# Patient Record
Sex: FEMALE | Race: WHITE | NOT HISPANIC OR LATINO | Employment: FULL TIME | URBAN - METROPOLITAN AREA
[De-identification: names, ages, dates, MRNs, and addresses within clinical notes are randomized per-mention and may not be internally consistent; named-entity substitution may affect disease eponyms.]

---

## 2017-05-17 ENCOUNTER — OFFICE VISIT (OUTPATIENT)
Dept: URGENT CARE | Facility: URGENT CARE | Age: 22
End: 2017-05-17
Payer: COMMERCIAL

## 2017-05-17 VITALS
HEART RATE: 88 BPM | DIASTOLIC BLOOD PRESSURE: 65 MMHG | OXYGEN SATURATION: 100 % | TEMPERATURE: 98.1 F | SYSTOLIC BLOOD PRESSURE: 103 MMHG

## 2017-05-17 DIAGNOSIS — L24.0 CONTACT DERMATITIS AND ECZEMA DUE TO DETERGENTS: ICD-10-CM

## 2017-05-17 DIAGNOSIS — J45.21 MILD INTERMITTENT ASTHMA WITH (ACUTE) EXACERBATION: ICD-10-CM

## 2017-05-17 DIAGNOSIS — J30.2 SEASONAL ALLERGIC RHINITIS, UNSPECIFIED ALLERGIC RHINITIS TRIGGER: Primary | ICD-10-CM

## 2017-05-17 DIAGNOSIS — J01.90 ACUTE SINUSITIS WITH SYMPTOMS > 10 DAYS: ICD-10-CM

## 2017-05-17 PROCEDURE — 99203 OFFICE O/P NEW LOW 30 MIN: CPT

## 2017-05-17 RX ORDER — FLUTICASONE PROPIONATE 50 MCG
1-2 SPRAY, SUSPENSION (ML) NASAL DAILY
Qty: 16 G | Refills: 0 | Status: SHIPPED | OUTPATIENT
Start: 2017-05-17 | End: 2018-10-24

## 2017-05-17 RX ORDER — ALBUTEROL SULFATE 90 UG/1
1-2 AEROSOL, METERED RESPIRATORY (INHALATION) EVERY 4 HOURS PRN
Qty: 1 INHALER | Refills: 0 | Status: SHIPPED | OUTPATIENT
Start: 2017-05-17 | End: 2018-10-24

## 2017-05-17 RX ORDER — AZITHROMYCIN 500 MG/1
500 TABLET, FILM COATED ORAL DAILY
Qty: 6 TABLET | Refills: 0 | Status: SHIPPED | OUTPATIENT
Start: 2017-05-17 | End: 2017-05-23

## 2017-05-17 NOTE — NURSING NOTE
"Chief Complaint   Patient presents with     Urgent Care     URI     Possible sinus infection that has been on and off since Dec 2016.     Derm Problem     Rash on back that patient noticed this morning that has gotten worst this evening.      Ent Problem     Patient is concern about her tonsils and believe it is causing pain in her left ear.        Initial /65 (BP Location: Right arm, Cuff Size: Adult Regular)  Pulse 88  Temp 98.1  F (36.7  C) (Oral)  SpO2 100%  Breastfeeding? No Estimated body mass index is 22.12 kg/(m^2) as calculated from the following:    Height as of 2/18/14: 5' 1.5\" (1.562 m).    Weight as of 2/18/14: 119 lb (54 kg).  Medication Reconciliation: complete.  IGNACIO Montanez      "

## 2017-05-17 NOTE — MR AVS SNAPSHOT
After Visit Summary   5/17/2017    Violet Rachel    MRN: [id number]           Patient Information     Date Of Birth          1995        Visit Information        Provider Department      5/17/2017 5:55 PM  URGENT CARE [facility]        Today's Diagnoses     Seasonal allergic rhinitis, unspecified allergic rhinitis trigger    -  1    Acute sinusitis with symptoms > 10 days        Mild intermittent asthma with (acute) exacerbation          Care Instructions      Asthma (Adult)  Asthma is a disease where the medium and  small air passages within the lung go into spasm and restrict the flow of air. Inflammation and swelling of the airways cause further restriction. During an acute asthma attack, these factors cause difficulty breathing, wheezing, cough and chest tightness.    An asthma attack can be triggered by many things. Common triggers include infections such as the common cold, bronchitis, pneumonia. Irritants such as smoke or pollutants in the air, emotional upset, and exercise can also trigger an attack. In many adults with asthma, allergies to dust, mold, pollen and animal dander can cause an asthma attack. Skipping doses of daily asthma medicine can also bring on an asthma attack.  Asthma can be controlled using the proper medicines prescribed by your healthcare provider and avoiding exposure to known triggers including allergens and irritants.  Home care    Take prescribed medicine exactly at the times advised. If you need medicine such as from a hand held inhaler or aerosol breathing machine more than every 4 hours, contact your healthcare provider or seek immediate medical attention. If prescribed an antibiotic or prednisone, take all of the medicine as prescribed, even if you are feeling better after a few days.    Do not smoke. Avoid being exposed to the smoke of others.    Some people with asthma have worsening of their symptoms when they take aspirin  "and non-steroidal or fever-reducing medicines like ibuprofen and naproxen. Talk to your healthcare provider if you think this may apply to you.  Follow-up care  Follow up with your healthcare provider, or as advised. Always bring all of your current medicines to any appointments with your healthcare provider. Also bring a complete list of medications even those not taken for asthma. If you do not already have one, talk to your healthcare provider about developing a personalized \"Asthma Action Plan.\"  A pneumococcal (pneumonia) vaccine and yearly flu shot (every fall) are recommended. Ask your doctor about this.  When to seek medical advice  Call your healthcare provider right away if any of these occur:     Increased wheezing or shortness of breath    Need to use your inhalers more often than usual without relief    Fever of 100.4 F (38 C) or higher, or as directed by your healthcare provider    Coughing up lots of dark-colored or bloody sputum (mucus)    Chest pain with each breath    If you use a peak flow meter as part of an Asthma Action Plan, and you are still in the yellow zone (50% to 80%) 15 minutes after using inhaler medicine.  Call 911  Call 911 if any of the following occur    Trouble walking or talking because of shortness of breath    If you use a peak flow meter as part of an Asthma Action Plan and you are still in the red zone (less than 50%) 15 minutes after using inhaler medicine    Lips or fingernails turning gray or blue    7031-0986 [employer]. [address] [place] [address], [place] [address]. All rights reserved. This information is not intended as a substitute for professional medical care. Always follow your healthcare professional's instructions.        Understanding Sinus Problems    You don t often think about your sinuses until there s a problem. One day you realize you can t smell dinner cooking. Or you find you often have problems breathing through your nose.  Symptoms of sinus " problems  Sinus problems can cause uncomfortable symptoms. Your nose may run constantly. You might have trouble sleeping at night. You may even lose your sense of smell. Other symptoms can include:    Nasal congestion    Fullness in ears    Green, yellow, or bloody drainage from the nose    Trouble tasting food    Frequent headaches    Facial pain    Cough  When sinuses are blocked  If something blocks the passages in the nose or sinuses, mucus can t drain. Mucus-filled sinuses often become infected.    Colds cause the lining of the nose and sinuses to swell and make extra mucus. A buildup of mucus can lead to a more serious infection.    Allergies irritate turbinates and other tissues. This causes swelling, which can cause a blockage. Over time, this irritation can also lead to sacs of swollen tissue (polyps).    Polyps may form in both the sinuses and nose. Polyps can grow large enough to clog nasal passages and block drainage.    A crooked (deviated) septum may block nasal passages. This is often the result of an injury.    6713-1981 [employer]. [address] [place] [address]. All rights reserved. This information is not intended as a substitute for professional medical care. Always follow your healthcare professional's instructions.        Sinusitis (Antibiotic Treatment)    The sinuses are air-filled spaces within the bones of the face. They connect to the inside of the nose. Sinusitis is an inflammation of the tissue lining the sinus cavity. Sinus inflammation can occur during a cold. It can also be due to allergies to pollens and other particles in the air. Sinusitis can cause symptoms of sinus congestion and fullness. A sinus infection causes fever, headache and facial pain. There is often green or yellow drainage from the nose or into the back of the throat (post-nasal drip). You have been given antibiotics to treat this condition.  Home care:    Take the full course of antibiotics  as instructed. Do not stop taking them, even if you feel better.    Drink plenty of water, hot tea, and other liquids. This may help thin mucus. It also may promote sinus drainage.    Heat may help soothe painful areas of the face. Use a towel soaked in hot water. Or,  the shower and direct the hot spray onto your face. Using a vaporizer along with a menthol rub at night may also help.     An expectorant containing guaifenesin may help thin the mucus and promote drainage from the sinuses.    Over-the-counter decongestants may be used unless a similar medicine was prescribed. Nasal sprays work the fastest. Use one that contains phenylephrine or oxymetazoline. First blow the nose gently. Then use the spray. Do not use these medicines more often than directed on the label or symptoms may get worse. You may also use tablets containing pseudoephedrine. Avoid products that combine ingredients, because side effects may be increased. Read labels. You can also ask the pharmacist for help. (NOTE: Persons with high blood pressure should not use decongestants. They can raise blood pressure.)    Over-the-counter antihistamines may help if allergies contributed to your sinusitis.      Do not use nasal rinses or irrigation during an acute sinus infection, unless told to by your health care provider. Rinsing may spread the infection to other sinuses.    Use acetaminophen or ibuprofen to control pain, unless another pain medicine was prescribed. (If you have chronic liver or kidney disease or ever had a stomach ulcer, talk with your doctor before using these medicines. Aspirin should never be used in anyone under 18 years of age who is ill with a fever. It may cause severe liver damage.)    Don't smoke. This can worsen symptoms.  Follow-up care  Follow up with your healthcare provider or our staff if you are not improving within the next week.  When to seek medical advice  Call your healthcare provider if any of these  occur:    Facial pain or headache becoming more severe    Stiff neck    Unusual drowsiness or confusion    Swelling of the forehead or eyelids    Vision problems, including blurred or double vision    Fever of 100.4 F (38 C) or higher, or as directed by your healthcare provider    Seizure    Breathing problems    Symptoms not resolving within 10 days    8660-9124 [employer]. [address] [place] [address]. All rights reserved. This information is not intended as a substitute for professional medical care. Always follow your healthcare professional's instructions.        Understanding Nasal Allergies  Nasal allergies (also called allergic rhinitis) are a common health problem. They may be seasonal.This means they cause symptoms only at certain times of the year. Or they may be perennial.This means they cause symptoms all year long. Other health problems, such as asthma, often occur along with allergies as well.    What Is an allergic reaction?  An allergy is a reaction to a substance called an allergen. Common allergens include:    Wind-borne pollen    Mold    Dust mites    Furry and feathered animals    Cockroaches  Normally, allergens are harmless. But when a person has allergies, their body thinks they are harmful. Their body then attacks allergens with antibodies. Antibodies are attached to special cells called mast cells. Allergens stick to the antibodies. This makes the mast cells release histamine and other chemicals. This is an allergic reaction. The chemicals irritate nearby nasal tissue. This causes nasal allergy symptoms.  Common nasal allergy symptoms  Allergies can cause nasal tissue to swell. This makes the air passages smaller. The nose may feel stuffed up. The nose may also make extra mucus, which can plug the nasal passages or drip out of the nose. Mucus can drip down the back of the throat (postnasal drip) as well. Sinus tissue can swell. This may cause pain and headache.  Common allergy symptoms include:    Runny nose with clear, watery discharge    Stuffy nose (nasal congestion)    Drainage down your throat (postnasal drip)    Sneezing    Red, watery eyes    Itchy nose, eyes, ears, and throat    Plugged-up ears (ear congestion)    Sore throat    Coughing    Sinus pain and swelling    Headache  It may not be allergies  Other health problems can cause symptoms like those of nasal allergies. These include:    Nonallergic rhinitis and viruses such as colds    Irritants and pollutants, such as strong odors or smoke    Certain medications    Changes in the weather   Treatment  Your health care provider will evaluate you to find the cause of your symptoms then recommend treatment. You may also be referred to an allergist.     6562-7910 [employer]. [address] [place] [address], [place] [address]. All rights reserved. This information is not intended as a substitute for professional medical care. Always follow your healthcare professional's instructions.              Follow-ups after your visit        Who to contact     If you have questions or need follow up information about today's clinic visit or your schedule please contact [facility] directly at [phone].  Normal or non-critical lab and imaging results will be communicated to you by [employer], letter or phone within 4 business days after the clinic has received the results. If you do not hear from us within 7 days, please contact the clinic through [employer] or phone. If you have a critical or abnormal lab result, we will notify you by phone as soon as possible.  Submit refill requests through [employer] or call your pharmacy and they will forward the refill request to us. Please allow 3 business days for your refill to be completed.          Additional Information About Your Visit        [employer] Information     [employer] gives you secure access to your electronic health record. If you see a primary care  provider, you can also send messages to your care team and make appointments. If you have questions, please call your primary care clinic.  If you do not have a primary care provider, please call [phone] and they will assist you.        Care EveryWhere ID     This is your Care EveryWhere ID. This could be used by other organizations to access your [facility] medical records  [id number]        Your Vitals Were     Pulse Temperature Pulse Oximetry Breastfeeding?          88 98.1  F (36.7  C) (Oral) 100% No         Blood Pressure from Last 3 Encounters:   05/17/17 103/65   02/18/14 96/58   01/13/14 (!) 86/60    Weight from Last 3 Encounters:   02/18/14 119 lb (54 kg) (33 %)*   01/13/14 121 lb 2 oz (54.9 kg) (38 %)*   08/19/13 123 lb (55.8 kg) (44 %)*     * Growth percentiles are based on [facility] 2[facility]20 Years data.              Today, you had the following     No orders found for display         Today's Medication Changes          These changes are accurate as of: 5/17/17  6:26 PM.  If you have any questions, ask your nurse or doctor.               Start taking these medicines.        Dose/Directions    azithromycin 500 MG tablet   Commonly known as:  ZITHROMAX   Used for:  Acute sinusitis with symptoms > 10 days, Mild intermittent asthma with (acute) exacerbation        Dose:  500 mg   Take 1 tablet (500 mg) by mouth daily for 6 days   Quantity:  6 tablet   Refills:  0       fluticasone 50 MCG/ACT spray   Commonly known as:  FLONASE   Used for:  Seasonal allergic rhinitis, unspecified allergic rhinitis trigger, Acute sinusitis with symptoms > 10 days        Dose:  1-2 spray   Spray 1-2 sprays into both nostrils daily   Quantity:  16 g   Refills:  0         These medicines have changed or have updated prescriptions.        Dose/Directions    * albuterol 108 (90 BASE) MCG/ACT Inhaler   Commonly known as:  PROAIR HFA/PROVENTIL HFA/VENTOLIN HFA   This may have changed:  Another medication with the same name was added. Make  sure you understand how and when to take each.   Used for:  Exercise-induced asthma   Changed by:  Renny Rocha PA-C        Dose:  2 puff   Inhale 2 puffs into the lungs every 4 hours as needed for shortness of breath / dyspnea.   Quantity:  1 Inhaler   Refills:  0       * albuterol 108 (90 BASE) MCG/ACT Inhaler   Commonly known as:  PROAIR HFA/PROVENTIL HFA/VENTOLIN HFA   This may have changed:  You were already taking a medication with the same name, and this prescription was added. Make sure you understand how and when to take each.   Used for:  Mild intermittent asthma with (acute) exacerbation        Dose:  1-2 puff   Inhale 1-2 puffs into the lungs every 4 hours as needed for shortness of breath / dyspnea or wheezing   Quantity:  1 Inhaler   Refills:  0       * Notice:  This list has 2 medication(s) that are the same as other medications prescribed for you. Read the directions carefully, and ask your doctor or other care provider to review them with you.         Where to get your medicines      These medications were sent to [facility] - [place], [place] - [address], [place] [address]     Phone:  [phone]     azithromycin 500 MG tablet    fluticasone 50 MCG/ACT spray         Some of these will need a paper prescription and others can be bought over the counter.  Ask your nurse if you have questions.     Bring a paper prescription for each of these medications     albuterol 108 (90 BASE) MCG/ACT Inhaler                Primary Care Provider Office Phone # Fax #    Sohail Newman [phone]       [address] [address]        Thank you!     Thank you for choosing [facility]  for your care. Our goal is always to provide you with excellent care. Hearing back from our patients is one way we can continue to improve our services. Please take a few minutes to complete the  written survey that you may receive in the mail after your visit with us. Thank you!             Your Updated Medication List - Protect others around you: Learn how to safely use, store and throw away your medicines at www.disposemymeds.org.          This list is accurate as of: 5/17/17  6:26 PM.  Always use your most recent med list.                   Brand Name Dispense Instructions for use    * albuterol 108 (90 BASE) MCG/ACT Inhaler    PROAIR HFA/PROVENTIL HFA/VENTOLIN HFA    1 Inhaler    Inhale 2 puffs into the lungs every 4 hours as needed for shortness of breath / dyspnea.       * albuterol 108 (90 BASE) MCG/ACT Inhaler    PROAIR HFA/PROVENTIL HFA/VENTOLIN HFA    1 Inhaler    Inhale 1-2 puffs into the lungs every 4 hours as needed for shortness of breath / dyspnea or wheezing       azithromycin 500 MG tablet    ZITHROMAX    6 tablet    Take 1 tablet (500 mg) by mouth daily for 6 days       benzoyl peroxide 10 % Crea     1 Tube    Externally apply 1 dose topically 2 times daily       fluticasone 50 MCG/ACT spray    FLONASE    16 g    Spray 1-2 sprays into both nostrils daily       multivitamin, therapeutic with minerals Tabs tablet     100 tablet    Take 1 tablet by mouth daily       sertraline 50 MG tablet    ZOLOFT    90 tablet    Take 1 tablet (50 mg) by mouth daily       UNKNOWN TO PATIENT      Take 1 tablet by mouth daily Reported on 5/17/2017       * Notice:  This list has 2 medication(s) that are the same as other medications prescribed for you. Read the directions carefully, and ask your doctor or other care provider to review them with you.

## 2017-05-17 NOTE — PROGRESS NOTES
SUBJECTIVE:  Chief Complaint   Patient presents with     Urgent Care     URI     Possible sinus infection that has been on and off since Dec 2016.     Derm Problem     Rash on back that patient noticed this morning that has gotten worst this evening.      Ent Problem     Patient is concern about her tonsils and believe it is causing pain in her left ear.      Violet Rachel is a 21 year old female here with concerns about sinus infection.  She states onset of symptoms were 2 week(s) ago.  She has had maxillary, frontal pressure. Course of illness is worsening. Severity moderate,    Current and Associated symptoms: nasal congestion, facial pain/pressure, headache and post-nasal drainage  Sore throat and left ear pain  Predisposing factors include seasonal allergies. Recent treatment has included: Antihistamine, Decongestants and OTC meds    She has intermittent asthma with worsening during this allergy season with occasional chest tightness, no wheezing    Past Medical History:   Diagnosis Date     Mononucleosis 12/28/2012       ALLERGIES:  Review of patient's allergies indicates no known allergies.      Current Outpatient Prescriptions on File Prior to Visit:  UNKNOWN TO PATIENT Take 1 tablet by mouth daily Reported on 5/17/2017   sertraline (ZOLOFT) 50 MG tablet Take 1 tablet (50 mg) by mouth daily (Patient not taking: Reported on 5/17/2017)   benzoyl peroxide 10 % CREA Externally apply 1 dose topically 2 times daily (Patient not taking: Reported on 5/17/2017)   multivitamin, therapeutic with minerals (MULTI-VITAMIN) TABS Take 1 tablet by mouth daily (Patient not taking: Reported on 5/17/2017)   albuterol (PROAIR HFA, PROVENTIL HFA, VENTOLIN HFA) 108 (90 BASE) MCG/ACT inhaler Inhale 2 puffs into the lungs every 4 hours as needed for shortness of breath / dyspnea. (Patient not taking: Reported on 5/17/2017)     No current facility-administered medications on file prior to visit.     Social History   Substance  Use Topics     Smoking status: Never Smoker     Smokeless tobacco: Never Used     Alcohol use No       Family History   Problem Relation Age of Onset     Asthma Mother      Neurologic Disorder Maternal Grandmother      parkinson's     Thyroid Disease Maternal Grandmother      Prostate Cancer Maternal Grandfather        ROS:  INTEGUMENTARY/SKIN: NEGATIVE for worrisome rashes, moles or lesions  EYES: NEGATIVE for vision changes or irritation  GI: NEGATIVE for nausea, abdominal pain, heartburn, or change in bowel habits    OBJECTIVE:  /65 (BP Location: Right arm, Cuff Size: Adult Regular)  Pulse 88  Temp 98.1  F (36.7  C) (Oral)  SpO2 100%  Breastfeeding? No  Exam:GENERAL APPEARANCE: alert, mild distress and cooperative  EYES: EOMI,  PERRL, conjunctiva clear  HENT: ear canals and TM's normal.  Nose and mouth without ulcers, erythema or lesions  HENT: frontal sinus tenderness  and maxillary sinus tenderness   NECK: supple, nontender, no lymphadenopathy  RESP: lungs clear to auscultation - no rales, rhonchi or wheezes  CV: regular rates and rhythm, normal S1 S2, no murmur noted  NEURO: Normal strength and tone, sensory exam grossly normal,  normal speech and mentation  SKIN: no suspicious lesions or rashes    ASSESSMENT:  Seasonal allergic rhinitis, unspecified allergic rhinitis trigger     - fluticasone (FLONASE) 50 MCG/ACT spray; Spray 1-2 sprays into both nostrils daily     We discussed that in cases of more severe allergic symptoms that dosing of the non-sedating antihistamines can be increased temporarily to 2-4 x the recommended dose on the package.       Acute sinusitis with symptoms > 10 days     - fluticasone (FLONASE) 50 MCG/ACT spray; Spray 1-2 sprays into both nostrils daily  - azithromycin (ZITHROMAX) 500 MG tablet; Take 1 tablet (500 mg) by mouth daily for 6 days       We discussed the primary importance of home cares to promote drainage and ventilation of the sinuses to decrease symptoms of sinus  pressure and to eliminate infectious drainage from the sinuses.  I encouraged the use of saline nasal spray as needed to promote cleaning of the nasal passages and to promote drainage of the sinuses.  Allergy medications and steroid nasal spray help reduce swelling within the nasal tissue and may help open drainage/ ventilation passages to the sinuses.  Expectorants are recommended rather than decongestants to help promote sinus drainage.  Antibiotics are discussed as a secondary therapy for sinus infections that are unresponsive to home measures to promote sinus drainage and ventilation.    Mild intermittent asthma with (acute) exacerbation     - albuterol (PROAIR HFA/PROVENTIL HFA/VENTOLIN HFA) 108 (90 BASE) MCG/ACT Inhaler; Inhale 1-2 puffs into the lungs every 4 hours as needed for shortness of breath / dyspnea or wheezing  - azithromycin (ZITHROMAX) 500 MG tablet; Take 1 tablet (500 mg) by mouth daily for 6 days    Contact dermatitis and eczema due to detergents  Keep area clean-  Extra rinse of detergents from clothing  May use OTC 1 % hydrocortisone for itching/ inflammation            Follow up with primary clinic if not improving

## 2017-05-17 NOTE — PATIENT INSTRUCTIONS
"  Asthma (Adult)  Asthma is a disease where the medium and  small air passages within the lung go into spasm and restrict the flow of air. Inflammation and swelling of the airways cause further restriction. During an acute asthma attack, these factors cause difficulty breathing, wheezing, cough and chest tightness.    An asthma attack can be triggered by many things. Common triggers include infections such as the common cold, bronchitis, pneumonia. Irritants such as smoke or pollutants in the air, emotional upset, and exercise can also trigger an attack. In many adults with asthma, allergies to dust, mold, pollen and animal dander can cause an asthma attack. Skipping doses of daily asthma medicine can also bring on an asthma attack.  Asthma can be controlled using the proper medicines prescribed by your healthcare provider and avoiding exposure to known triggers including allergens and irritants.  Home care    Take prescribed medicine exactly at the times advised. If you need medicine such as from a hand held inhaler or aerosol breathing machine more than every 4 hours, contact your healthcare provider or seek immediate medical attention. If prescribed an antibiotic or prednisone, take all of the medicine as prescribed, even if you are feeling better after a few days.    Do not smoke. Avoid being exposed to the smoke of others.    Some people with asthma have worsening of their symptoms when they take aspirin and non-steroidal or fever-reducing medicines like ibuprofen and naproxen. Talk to your healthcare provider if you think this may apply to you.  Follow-up care  Follow up with your healthcare provider, or as advised. Always bring all of your current medicines to any appointments with your healthcare provider. Also bring a complete list of medications even those not taken for asthma. If you do not already have one, talk to your healthcare provider about developing a personalized \"Asthma Action Plan.\"  A " pneumococcal (pneumonia) vaccine and yearly flu shot (every fall) are recommended. Ask your doctor about this.  When to seek medical advice  Call your healthcare provider right away if any of these occur:     Increased wheezing or shortness of breath    Need to use your inhalers more often than usual without relief    Fever of 100.4 F (38 C) or higher, or as directed by your healthcare provider    Coughing up lots of dark-colored or bloody sputum (mucus)    Chest pain with each breath    If you use a peak flow meter as part of an Asthma Action Plan, and you are still in the yellow zone (50% to 80%) 15 minutes after using inhaler medicine.  Call 911  Call 911 if any of the following occur    Trouble walking or talking because of shortness of breath    If you use a peak flow meter as part of an Asthma Action Plan and you are still in the red zone (less than 50%) 15 minutes after using inhaler medicine    Lips or fingernails turning gray or blue    1911-9594 [employer]. [address] [place] [address]. All rights reserved. This information is not intended as a substitute for professional medical care. Always follow your healthcare professional's instructions.        Understanding Sinus Problems    You don t often think about your sinuses until there s a problem. One day you realize you can t smell dinner cooking. Or you find you often have problems breathing through your nose.  Symptoms of sinus problems  Sinus problems can cause uncomfortable symptoms. Your nose may run constantly. You might have trouble sleeping at night. You may even lose your sense of smell. Other symptoms can include:    Nasal congestion    Fullness in ears    Green, yellow, or bloody drainage from the nose    Trouble tasting food    Frequent headaches    Facial pain    Cough  When sinuses are blocked  If something blocks the passages in the nose or sinuses, mucus can t drain. Mucus-filled sinuses often become  infected.    Colds cause the lining of the nose and sinuses to swell and make extra mucus. A buildup of mucus can lead to a more serious infection.    Allergies irritate turbinates and other tissues. This causes swelling, which can cause a blockage. Over time, this irritation can also lead to sacs of swollen tissue (polyps).    Polyps may form in both the sinuses and nose. Polyps can grow large enough to clog nasal passages and block drainage.    A crooked (deviated) septum may block nasal passages. This is often the result of an injury.    [phone] [employer]. [address] [place] [address]. All rights reserved. This information is not intended as a substitute for professional medical care. Always follow your healthcare professional's instructions.        Sinusitis (Antibiotic Treatment)    The sinuses are air-filled spaces within the bones of the face. They connect to the inside of the nose. Sinusitis is an inflammation of the tissue lining the sinus cavity. Sinus inflammation can occur during a cold. It can also be due to allergies to pollens and other particles in the air. Sinusitis can cause symptoms of sinus congestion and fullness. A sinus infection causes fever, headache and facial pain. There is often green or yellow drainage from the nose or into the back of the throat (post-nasal drip). You have been given antibiotics to treat this condition.  Home care:    Take the full course of antibiotics as instructed. Do not stop taking them, even if you feel better.    Drink plenty of water, hot tea, and other liquids. This may help thin mucus. It also may promote sinus drainage.    Heat may help soothe painful areas of the face. Use a towel soaked in hot water. Or,  the shower and direct the hot spray onto your face. Using a vaporizer along with a menthol rub at night may also help.     An expectorant containing guaifenesin may help thin the mucus and promote drainage from the  sinuses.    Over-the-counter decongestants may be used unless a similar medicine was prescribed. Nasal sprays work the fastest. Use one that contains phenylephrine or oxymetazoline. First blow the nose gently. Then use the spray. Do not use these medicines more often than directed on the label or symptoms may get worse. You may also use tablets containing pseudoephedrine. Avoid products that combine ingredients, because side effects may be increased. Read labels. You can also ask the pharmacist for help. (NOTE: Persons with high blood pressure should not use decongestants. They can raise blood pressure.)    Over-the-counter antihistamines may help if allergies contributed to your sinusitis.      Do not use nasal rinses or irrigation during an acute sinus infection, unless told to by your health care provider. Rinsing may spread the infection to other sinuses.    Use acetaminophen or ibuprofen to control pain, unless another pain medicine was prescribed. (If you have chronic liver or kidney disease or ever had a stomach ulcer, talk with your doctor before using these medicines. Aspirin should never be used in anyone under 18 years of age who is ill with a fever. It may cause severe liver damage.)    Don't smoke. This can worsen symptoms.  Follow-up care  Follow up with your healthcare provider or our staff if you are not improving within the next week.  When to seek medical advice  Call your healthcare provider if any of these occur:    Facial pain or headache becoming more severe    Stiff neck    Unusual drowsiness or confusion    Swelling of the forehead or eyelids    Vision problems, including blurred or double vision    Fever of 100.4 F (38 C) or higher, or as directed by your healthcare provider    Seizure    Breathing problems    Symptoms not resolving within 10 days    [phone] [employer]. [address] [place] [address], [place] [address]. All rights reserved. This information is not intended as a  substitute for professional medical care. Always follow your healthcare professional's instructions.        Understanding Nasal Allergies  Nasal allergies (also called allergic rhinitis) are a common health problem. They may be seasonal.This means they cause symptoms only at certain times of the year. Or they may be perennial.This means they cause symptoms all year long. Other health problems, such as asthma, often occur along with allergies as well.    What Is an allergic reaction?  An allergy is a reaction to a substance called an allergen. Common allergens include:    Wind-borne pollen    Mold    Dust mites    Furry and feathered animals    Cockroaches  Normally, allergens are harmless. But when a person has allergies, their body thinks they are harmful. Their body then attacks allergens with antibodies. Antibodies are attached to special cells called mast cells. Allergens stick to the antibodies. This makes the mast cells release histamine and other chemicals. This is an allergic reaction. The chemicals irritate nearby nasal tissue. This causes nasal allergy symptoms.  Common nasal allergy symptoms  Allergies can cause nasal tissue to swell. This makes the air passages smaller. The nose may feel stuffed up. The nose may also make extra mucus, which can plug the nasal passages or drip out of the nose. Mucus can drip down the back of the throat (postnasal drip) as well. Sinus tissue can swell. This may cause pain and headache. Common allergy symptoms include:    Runny nose with clear, watery discharge    Stuffy nose (nasal congestion)    Drainage down your throat (postnasal drip)    Sneezing    Red, watery eyes    Itchy nose, eyes, ears, and throat    Plugged-up ears (ear congestion)    Sore throat    Coughing    Sinus pain and swelling    Headache  It may not be allergies  Other health problems can cause symptoms like those of nasal allergies. These include:    Nonallergic rhinitis and viruses such as  colds    Irritants and pollutants, such as strong odors or smoke    Certain medications    Changes in the weather   Treatment  Your health care provider will evaluate you to find the cause of your symptoms then recommend treatment. You may also be referred to an allergist.     [phone] [employer]. [address] [place] [address]. All rights reserved. This information is not intended as a substitute for professional medical care. Always follow your healthcare professional's instructions.

## 2017-06-24 ENCOUNTER — HEALTH MAINTENANCE LETTER (OUTPATIENT)
Age: 22
End: 2017-06-24

## 2017-09-20 ENCOUNTER — OFFICE VISIT (OUTPATIENT)
Dept: PEDIATRICS | Facility: CLINIC | Age: 22
End: 2017-09-20
Payer: COMMERCIAL

## 2017-09-20 VITALS
SYSTOLIC BLOOD PRESSURE: 106 MMHG | TEMPERATURE: 98.1 F | BODY MASS INDEX: 22.49 KG/M2 | OXYGEN SATURATION: 99 % | WEIGHT: 122.2 LBS | HEART RATE: 66 BPM | HEIGHT: 62 IN | DIASTOLIC BLOOD PRESSURE: 58 MMHG

## 2017-09-20 DIAGNOSIS — B07.8 COMMON WART: ICD-10-CM

## 2017-09-20 DIAGNOSIS — B36.0 TINEA VERSICOLOR: Primary | ICD-10-CM

## 2017-09-20 PROCEDURE — 99213 OFFICE O/P EST LOW 20 MIN: CPT | Mod: GC | Performed by: INTERNAL MEDICINE

## 2017-09-20 RX ORDER — FLUCONAZOLE 150 MG/1
300 TABLET ORAL WEEKLY
Qty: 2 TABLET | Refills: 0 | Status: SHIPPED | OUTPATIENT
Start: 2017-09-20 | End: 2018-10-24

## 2017-09-20 NOTE — NURSING NOTE
"Chief Complaint   Patient presents with     Derm Problem       Initial /58 (BP Location: Right arm, Patient Position: Chair, Cuff Size: Adult Regular)  Pulse 66  Temp 98.1  F (36.7  C) (Oral)  Ht 5' 1.5\" (1.562 m)  Wt 122 lb 3.2 oz (55.4 kg)  SpO2 99%  BMI 22.72 kg/m2 Estimated body mass index is 22.72 kg/(m^2) as calculated from the following:    Height as of this encounter: 5' 1.5\" (1.562 m).    Weight as of this encounter: 122 lb 3.2 oz (55.4 kg).  Medication Reconciliation: complete   SMA Scout 9/20/2017, 3:12 PM    "

## 2017-09-20 NOTE — PATIENT INSTRUCTIONS
Rash/Scaling  - Take fluconazole 300mg once per week for 2 weeks  - Use emollients/lotions multiple times per day  - If not improving in two weeks, follow up with dermatology

## 2017-09-20 NOTE — PROGRESS NOTES
SUBJECTIVE:   Violet Rachel is a 21 year old female who presents to clinic today for the following health issues:    Rash      Duration: a couple of months    Description  Location: ribs and back  Itching: no    Intensity:  mild    Accompanying signs and symptoms: dry and flaky, red/brown color    History (similar episodes/previous evaluation): None    Precipitating or alleviating factors:  New exposures:  None  Recent travel: no      Therapies tried and outcome:  calamine lotion, made no difference    Noticed right sided scaling rash following presentation to Urgent Care on 5/17/17 for sinus infection. Since then, has noted multiple progressive spots over her upper & lower back, and now left side. Thought it was initially due to contact with bra strap, but this wouldn't explain lower back lesions.     Has history of exercise induced asthma & seasonal allergies. Endorses eczema as a child.     Notes rash was just discolored, but started to scale this week, prompting presentation. No fevers, chills, recent URIs (since 5/17). No changes in exposures, perfumes, detergents, clothing, or lotions. Has tried calamine without significant improvement.     Patient also with history of right knee common wart. Required three rounds of cryotherapy for treatment. Now with hypopigmented skin over previous treatment site. Has two new warts superior to right knee. Does not want to pursue cryotherapy due to cost.     Problem list and histories reviewed & adjusted, as indicated.  Additional history: as documented    Patient Active Problem List   Diagnosis     Exercise-induced asthma     Panic attack     Major depressive disorder, single episode, moderate (H)     Past Surgical History:   Procedure Laterality Date     NO HISTORY OF SURGERY         Social History   Substance Use Topics     Smoking status: Never Smoker     Smokeless tobacco: Never Used     Alcohol use No     Family History   Problem Relation Age of Onset      "Asthma Mother      Neurologic Disorder Maternal Grandmother      parkinson's     Thyroid Disease Maternal Grandmother      Prostate Cancer Maternal Grandfather          Reviewed and updated as needed this visit by clinical staff     Reviewed and updated as needed this visit by Provider       ROS:  Constitutional, HEENT, cardiovascular, pulmonary, gi and gu systems are negative, except as otherwise noted.    OBJECTIVE:   /58 (BP Location: Right arm, Patient Position: Chair, Cuff Size: Adult Regular)  Pulse 66  Temp 98.1  F (36.7  C) (Oral)  Ht 5' 1.5\" (1.562 m)  Wt 122 lb 3.2 oz (55.4 kg)  SpO2 99%  BMI 22.72 kg/m2  Body mass index is 22.72 kg/(m^2).  GENERAL: healthy, alert and no distress  RESP: Breathing comfortably on room air  MS: no gross musculoskeletal defects noted, no edema  SKIN: Right side with 2cm x 1cm flaky discolored patches. Upper & lower back with 30+ small lesions varying from <1cm to 2cm x 2cm plaque in the center of her back. Non erythematous. Pad of right pointer finger with small verruca. Right knee with two small verrucas superior to previous freeze burn discoloration.  NEURO: Normal strength and tone, mentation intact and speech normal    Diagnostic Test Results:  none     ASSESSMENT/PLAN:   Violet Rachel is a 21 year old female who presents for evaluation of raised, hyperpigmented scaly rash across her side & back. Appearance is consistent with tinea, likely tinea versicolor. Will treat with fluconazole. Also provided salicylic acid treatment for home wart therapy.    1. Tinea versicolor  - Fluconazole 300mg once weekly for 2 weeks  - If not improving, provided DERMATOLOGY REFERRAL    2. Common wart  - salicylic acid (MEDIPLAST) 40 % MISC; Apply 1 dose. topically At Bedtime Each night, Scrape or loofa the wart(s) and then soak foot for 10-15 min in warm water.  Cut plaster to fit exactly over 1 wart each.  Tape each in place for overnight and remove the next morning.  Dispense: 1 " each; Refill: 11    Follow up with Dermatology if rash not improving. Follow up as needed otherwise.    Karsten Edwards MD  [facility]    I have seen and examined the patient, discussed with the resident and agree with the history, physical and plan as documented above.    Nikki Boland MD  Internal Medicine - Pediatrics

## 2017-09-20 NOTE — MR AVS SNAPSHOT
After Visit Summary   9/20/2017    Violet Rachel    MRN: [id number]           Patient Information     Date Of Birth          1995        Visit Information        Provider Department      9/20/2017 3:00 PM Karsten Edwards MD [facility]        Today's Diagnoses     Common wart    -  1    Tinea versicolor          Care Instructions    Rash/Scaling  - Take fluconazole 300mg once per week for 2 weeks  - Use emollients/lotions multiple times per day  - If not improving in two weeks, follow up with dermatology            Follow-ups after your visit        Additional Services     DERMATOLOGY REFERRAL       Your provider has referred you to: [facility]: [facility] [phone]   [email or web address]    Please be aware that coverage of these services is subject to the terms and limitations of your health insurance plan.  Call member services at your health plan with any benefit or coverage questions.      Please bring the following with you to your appointment:    (1) Any X-Rays, CTs or MRIs which have been performed.  Contact the facility where they were done to arrange for  prior to your scheduled appointment.    (2) List of current medications  (3) This referral request   (4) Any documents/labs given to you for this referral                  Who to contact     If you have questions or need follow up information about today's clinic visit or your schedule please contact [facility] directly at [phone].  Normal or non-critical lab and imaging results will be communicated to you by MyChart, letter or phone within 4 business days after the clinic has received the results. If you do not hear from us within 7 days, please contact the clinic through MyChart or phone. If you have a critical or abnormal lab result, we will notify you by phone as soon as possible.  Submit refill requests through [employer] or call your pharmacy and they will  "forward the refill request to us. Please allow 3 business days for your refill to be completed.          Additional Information About Your Visit        [employer] Information     [employer] gives you secure access to your electronic health record. If you see a primary care provider, you can also send messages to your care team and make appointments. If you have questions, please call your primary care clinic.  If you do not have a primary care provider, please call [phone] and they will assist you.        Care EveryWhere ID     This is your Care EveryWhere ID. This could be used by other organizations to access your [facility] medical records  [id number]        Your Vitals Were     Pulse Temperature Height Pulse Oximetry BMI (Body Mass Index)       66 98.1  F (36.7  C) (Oral) 5' 1.5\" (1.562 m) 99% 22.72 kg/m2        Blood Pressure from Last 3 Encounters:   09/20/17 106/58   05/17/17 103/65   02/18/14 96/58    Weight from Last 3 Encounters:   09/20/17 122 lb 3.2 oz (55.4 kg)   02/18/14 119 lb (54 kg) (33 %)*   01/13/14 121 lb 2 oz (54.9 kg) (38 %)*     * Growth percentiles are based on CDC 2-20 Years data.              We Performed the Following     DERMATOLOGY REFERRAL          Today's Medication Changes          These changes are accurate as of: 9/20/17  3:51 PM.  If you have any questions, ask your nurse or doctor.               Start taking these medicines.        Dose/Directions    fluconazole 150 MG tablet   Commonly known as:  DIFLUCAN   Used for:  Tinea versicolor   Started by:  Karsten Edwards MD        Dose:  300 mg   Take 2 tablets (300 mg) by mouth once a week   Quantity:  2 tablet   Refills:  0       salicylic acid 40 % Misc   Commonly known as:  MEDIPLAST   Used for:  Common wart   Started by:  Karsten Edwards MD        Dose:  1 dose.   Apply 1 dose. topically At Bedtime Each night, Scrape or loofa the wart(s) and then soak foot for 10-15 min in warm water.  Cut plaster to fit exactly over 1 wart each. "  Tape each in place for overnight and remove the next morning.   Quantity:  1 each   Refills:  11            Where to get your medicines      These medications were sent to [facility] [place] - [place] - [address]   [address], [place] [address]     Phone:  [phone]     fluconazole 150 MG tablet         Some of these will need a paper prescription and others can be bought over the counter.  Ask your nurse if you have questions.     Bring a paper prescription for each of these medications     salicylic acid 40 % Misc                Primary Care Provider Office Phone # Fax #    Sohail Newman [phone]       [address] [place] [id number]        Equal Access to Services     PHONG REDDY : Hadii laureano suarez hadasho Soomaali, waaxda luqadaha, qaybta kaalmada adeegyada, jose a manzano. So [facility] [phone].    ATENCIÓN: Si habla español, tiene a johnson disposición servicios gratuitos de asistencia lingüística. Llame al [phone].    We comply with applicable federal civil rights laws and [place] laws. We do not discriminate on the basis of race, color, national origin, age, disability sex, sexual orientation or gender identity.            Thank you!     Thank you for choosing [facility]  for your care. Our goal is always to provide you with excellent care. Hearing back from our patients is one way we can continue to improve our services. Please take a few minutes to complete the written survey that you may receive in the mail after your visit with us. Thank you!             Your Updated Medication List - Protect others around you: Learn how to safely use, store and throw away your medicines at www.disposemymeds.org.          This list is accurate as of: 9/20/17  3:51 PM.  Always use your most recent med list.                   Brand Name Dispense Instructions for use Diagnosis    * albuterol 108 (90  BASE) MCG/ACT Inhaler    PROAIR HFA/PROVENTIL HFA/VENTOLIN HFA    1 Inhaler    Inhale 2 puffs into the lungs every 4 hours as needed for shortness of breath / dyspnea.    Exercise-induced asthma       * albuterol 108 (90 BASE) MCG/ACT Inhaler    PROAIR HFA/PROVENTIL HFA/VENTOLIN HFA    1 Inhaler    Inhale 1-2 puffs into the lungs every 4 hours as needed for shortness of breath / dyspnea or wheezing    Mild intermittent asthma with (acute) exacerbation       benzoyl peroxide 10 % Crea     1 Tube    Externally apply 1 dose topically 2 times daily    Acne       fluconazole 150 MG tablet    DIFLUCAN    2 tablet    Take 2 tablets (300 mg) by mouth once a week    Tinea versicolor       fluticasone 50 MCG/ACT spray    FLONASE    16 g    Spray 1-2 sprays into both nostrils daily    Seasonal allergic rhinitis, unspecified allergic rhinitis trigger, Acute sinusitis with symptoms > 10 days       multivitamin, therapeutic with minerals Tabs tablet     100 tablet    Take 1 tablet by mouth daily    Major depressive disorder, single episode, moderate (H)       salicylic acid 40 % Misc    MEDIPLAST    1 each    Apply 1 dose. topically At Bedtime Each night, Scrape or loofa the wart(s) and then soak foot for 10-15 min in warm water.  Cut plaster to fit exactly over 1 wart each.  Tape each in place for overnight and remove the next morning.    Common wart       sertraline 50 MG tablet    ZOLOFT    90 tablet    Take 1 tablet (50 mg) by mouth daily    Panic attack, Major depressive disorder, single episode, moderate (H)       * Notice:  This list has 2 medication(s) that are the same as other medications prescribed for you. Read the directions carefully, and ask your doctor or other care provider to review them with you.

## 2017-09-21 PROBLEM — B07.8 COMMON WART: Status: ACTIVE | Noted: 2017-09-21

## 2017-09-21 PROBLEM — B36.0 TINEA VERSICOLOR: Status: ACTIVE | Noted: 2017-09-21

## 2018-10-21 ASSESSMENT — ENCOUNTER SYMPTOMS
MYALGIAS: 1
DIZZINESS: 0
NERVOUS/ANXIOUS: 0
PARESTHESIAS: 0
HEMATOCHEZIA: 0
HEMATURIA: 0
FEVER: 0
CHILLS: 0
DYSURIA: 0
CONSTIPATION: 1
HEADACHES: 1
WEAKNESS: 0
HEARTBURN: 0
ARTHRALGIAS: 1
COUGH: 0
ABDOMINAL PAIN: 0
SHORTNESS OF BREATH: 1
PALPITATIONS: 1
DIARRHEA: 0
SORE THROAT: 0
EYE PAIN: 1
FREQUENCY: 0
JOINT SWELLING: 0
BREAST MASS: 0
NAUSEA: 0

## 2018-10-24 ENCOUNTER — OFFICE VISIT (OUTPATIENT)
Dept: PEDIATRICS | Facility: CLINIC | Age: 23
End: 2018-10-24
Payer: COMMERCIAL

## 2018-10-24 VITALS
HEIGHT: 62 IN | SYSTOLIC BLOOD PRESSURE: 118 MMHG | OXYGEN SATURATION: 98 % | HEART RATE: 96 BPM | TEMPERATURE: 98.8 F | BODY MASS INDEX: 22.34 KG/M2 | WEIGHT: 121.4 LBS | DIASTOLIC BLOOD PRESSURE: 60 MMHG

## 2018-10-24 DIAGNOSIS — Z11.3 SCREEN FOR STD (SEXUALLY TRANSMITTED DISEASE): ICD-10-CM

## 2018-10-24 DIAGNOSIS — J45.990 EXERCISE-INDUCED ASTHMA: ICD-10-CM

## 2018-10-24 DIAGNOSIS — B36.0 TINEA VERSICOLOR: ICD-10-CM

## 2018-10-24 DIAGNOSIS — Z00.00 ROUTINE GENERAL MEDICAL EXAMINATION AT A HEALTH CARE FACILITY: Primary | ICD-10-CM

## 2018-10-24 PROBLEM — B07.8 COMMON WART: Status: RESOLVED | Noted: 2017-09-21 | Resolved: 2018-10-24

## 2018-10-24 PROBLEM — Z86.59 HISTORY OF DEPRESSION: Status: ACTIVE | Noted: 2018-10-24

## 2018-10-24 PROCEDURE — 99395 PREV VISIT EST AGE 18-39: CPT | Performed by: INTERNAL MEDICINE

## 2018-10-24 PROCEDURE — 87591 N.GONORRHOEAE DNA AMP PROB: CPT | Performed by: INTERNAL MEDICINE

## 2018-10-24 PROCEDURE — 87491 CHLMYD TRACH DNA AMP PROBE: CPT | Performed by: INTERNAL MEDICINE

## 2018-10-24 RX ORDER — FLUCONAZOLE 150 MG/1
TABLET ORAL
Qty: 4 TABLET | Refills: 0 | Status: SHIPPED | OUTPATIENT
Start: 2018-10-24 | End: 2021-11-03

## 2018-10-24 RX ORDER — FLUTICASONE PROPIONATE 50 MCG
1-2 SPRAY, SUSPENSION (ML) NASAL DAILY
Qty: 16 G | Refills: 3 | Status: SHIPPED | OUTPATIENT
Start: 2018-10-24

## 2018-10-24 RX ORDER — ALBUTEROL SULFATE 90 UG/1
1-2 AEROSOL, METERED RESPIRATORY (INHALATION) EVERY 4 HOURS PRN
Qty: 1 INHALER | Refills: 0 | Status: SHIPPED | OUTPATIENT
Start: 2018-10-24 | End: 2021-11-03

## 2018-10-24 ASSESSMENT — ENCOUNTER SYMPTOMS
PALPITATIONS: 1
MYALGIAS: 1
NERVOUS/ANXIOUS: 0
BREAST MASS: 0
COUGH: 0
CONSTIPATION: 1
ABDOMINAL PAIN: 0
PARESTHESIAS: 0
HEMATURIA: 0
SORE THROAT: 0
DIARRHEA: 0
DIZZINESS: 0
HEADACHES: 1
SHORTNESS OF BREATH: 1
DYSURIA: 0
HEMATOCHEZIA: 0
CHILLS: 0
NAUSEA: 0
FREQUENCY: 0
ARTHRALGIAS: 1
JOINT SWELLING: 0
WEAKNESS: 0
FEVER: 0
EYE PAIN: 1
HEARTBURN: 0

## 2018-10-24 NOTE — PATIENT INSTRUCTIONS
Ask pharmacist about if Flonase is cheaper as a prescription or over the counter.     Stop by any pharmacy to get your tetanus and flu shot.     [facility]; you need to let them know you are due for a pap at that time.     Work on weaning down on vapeing.     Work on prepping easy meals to bring with you.    If you are not eating any calcium then take a calcium supplement twice per day once with breakfast and dinner.     Eat a healthy, balanced diet that consists of 1/2 plate of fruits and vegetables, 1/4 plate complex carbohydrate and 1/4 plate lean protein.       Preventive Health Recommendations  Female Ages 21 to 25     Yearly exam:     See your health care provider every year in order to  o Review health changes.   o Discuss preventive care.    o Review your medicines if your doctor has prescribed any.      You should be tested each year for STDs (sexually transmitted diseases).       Talk to your provider about how often you should have cholesterol testing.      Get a Pap test every three years. If you have an abnormal result, your doctor may have you test more often.      If you are at risk for diabetes, you should have a diabetes test (fasting glucose).     Shots:     Get a flu shot each year.     Get a tetanus shot every 10 years.     Consider getting the shot (vaccine) that prevents cervical cancer (Gardasil).    Nutrition:     Eat at least 5 servings of fruits and vegetables each day.    Eat whole-grain bread, whole-wheat pasta and brown rice instead of white grains and rice.    Get adequate Calcium and Vitamin D.     Lifestyle    Exercise at least 150 minutes a week each week (30 minutes a day, 5 days a week). This will help you control your weight and prevent disease.    Limit alcohol to one drink per day.    No smoking.     Wear sunscreen to prevent skin cancer.    See your dentist every six months for an exam and cleaning.

## 2018-10-24 NOTE — MR AVS SNAPSHOT
After Visit Summary   10/24/2018    Violet Rachel    MRN: [id number]           Patient Information     Date Of Birth          1995        Visit Information        Provider Department      10/24/2018 11:40 AM Polina Philip MD [facility]        Today's Diagnoses     Routine general medical examination at a health care facility    -  1    Exercise-induced asthma        Tinea versicolor        Screen for STD (sexually transmitted disease)          Care Instructions    Ask pharmacist about if Flonase is cheaper as a prescription or over the counter.     Stop by any pharmacy to get your tetanus and flu shot.     [facility]; you need to let them know you are due for a pap at that time.     Work on weaning down on vapeing.     Work on prepping easy meals to bring with you.    If you are not eating any calcium then take a calcium supplement twice per day once with breakfast and dinner.     Eat a healthy, balanced diet that consists of 1/2 plate of fruits and vegetables, 1/4 plate complex carbohydrate and 1/4 plate lean protein.       Preventive Health Recommendations  Female Ages 21 to 25     Yearly exam:     See your health care provider every year in order to  o Review health changes.   o Discuss preventive care.    o Review your medicines if your doctor has prescribed any.      You should be tested each year for STDs (sexually transmitted diseases).       Talk to your provider about how often you should have cholesterol testing.      Get a Pap test every three years. If you have an abnormal result, your doctor may have you test more often.      If you are at risk for diabetes, you should have a diabetes test (fasting glucose).     Shots:     Get a flu shot each year.     Get a tetanus shot every 10 years.     Consider getting the shot (vaccine) that prevents cervical cancer (Gardasil).    Nutrition:     Eat at least 5 servings of fruits and  vegetables each day.    Eat whole-grain bread, whole-wheat pasta and brown rice instead of white grains and rice.    Get adequate Calcium and Vitamin D.     Lifestyle    Exercise at least 150 minutes a week each week (30 minutes a day, 5 days a week). This will help you control your weight and prevent disease.    Limit alcohol to one drink per day.    No smoking.     Wear sunscreen to prevent skin cancer.    See your dentist every six months for an exam and cleaning.          Follow-ups after your visit        Follow-up notes from your care team     Return in about 1 year (around 10/24/2019) for Physical Exam.      Who to contact     If you have questions or need follow up information about today's clinic visit or your schedule please [facility] at [phone].  Normal or non-critical lab and imaging results will be communicated to you by MyChart, letter or phone within 4 business days after the clinic has received the results. If you do not hear from us within 7 days, please contact the clinic through [employer] or phone. If you have a critical or abnormal lab result, we will notify you by phone as soon as possible.  Submit refill requests through [employer] or call your pharmacy and they will forward the refill request to us. Please allow 3 business days for your refill to be completed.          Additional Information About Your Visit        [facility] Information     [employer] gives you secure access to your electronic health record. If you see a primary care provider, you can also send messages to your care team and make appointments. If you have questions, please call your primary care clinic.  If you do not have a primary care provider, please call [phone] and they will assist you.        Care EveryWhere ID     This is your Care EveryWhere ID. This could be used by other organizations to access your [facility] medical records  [id number]        Your Vitals Were     Pulse Temperature  "Height Pulse Oximetry BMI (Body Mass Index)       96 98.8  F (37.1  C) (Oral) 5' 1.5\" (1.562 m) 98% 22.57 kg/m2        Blood Pressure from Last 3 Encounters:   10/24/18 118/60   09/20/17 106/58   05/17/17 103/65    Weight from Last 3 Encounters:   10/24/18 121 lb 6.4 oz (55.1 kg)   09/20/17 122 lb 3.2 oz (55.4 kg)   02/18/14 119 lb (54 kg) (33 %)*     * Growth percentiles are based on [facility] 2-20 Years data.              We Performed the Following     CHLAMYDIA TRACHOMATIS PCR     NEISSERIA GONORRHOEA PCR          Today's Medication Changes          These changes are accurate as of 10/24/18 12:38 PM.  If you have any questions, ask your nurse or doctor.               These medicines have changed or have updated prescriptions.        Dose/Directions    albuterol 108 (90 Base) MCG/ACT inhaler   Commonly known as:  PROAIR HFA/PROVENTIL HFA/VENTOLIN HFA   This may have changed:  Another medication with the same name was removed. Continue taking this medication, and follow the directions you see here.   Used for:  Exercise-induced asthma   Changed by:  Polina Philip MD        Dose:  1-2 puff   Inhale 1-2 puffs into the lungs every 4 hours as needed for shortness of breath / dyspnea or wheezing   Quantity:  1 Inhaler   Refills:  0       fluconazole 150 MG tablet   Commonly known as:  DIFLUCAN   This may have changed:    - how much to take  - how to take this  - when to take this  - additional instructions   Used for:  Tinea versicolor   Changed by:  Polina Philip MD        2 pills once weekly for 2 weeks.   Quantity:  4 tablet   Refills:  0         Stop taking these medicines if you haven't already. Please contact your care team if you have questions.     sertraline 50 MG tablet   Commonly known as:  ZOLOFT   Stopped by:  Polina Philip MD                Where to get your medicines      These medications were sent to [facility] - [place] - [address]  AT SEC OF " [address] [facility]  [address] [place] [address]     Phone:  [phone]     albuterol 108 (90 Base) MCG/ACT inhaler    fluconazole 150 MG tablet    fluticasone 50 MCG/ACT spray                Primary Care Provider Office Phone # Fax #    Sohail Newman [phone]       [address] [place] [address]        Equal Access to Services     VAL REDDY : Hadii aad ku hadasho Soomaali, waaxda luqadaha, qaybta kaalmada adeegyada, waxay idiin hayaan adeeg kharash laclyde . So [facility] [phone].    ATENCIÓN: Si habla español, tiene a johnson disposición servicios gratuitos de asistencia lingüística. [facility] [phone].    We comply with applicable federal civil rights laws and [place] laws. We do not discriminate on the basis of race, color, national origin, age, disability, sex, sexual orientation, or gender identity.            Thank you!     Thank you for choosing [facility] [place]  for your care. Our goal is always to provide you with excellent care. Hearing back from our patients is one way we can continue to improve our services. Please take a few minutes to complete the written survey that you may receive in the mail after your visit with us. Thank you!             Your Updated Medication List - Protect others around you: Learn how to safely use, store and throw away your medicines at www.disposemymeds.org.          This list is accurate as of 10/24/18 12:38 PM.  Always use your most recent med list.                   Brand Name Dispense Instructions for use Diagnosis    albuterol 108 (90 Base) MCG/ACT inhaler    PROAIR HFA/PROVENTIL HFA/VENTOLIN HFA    1 Inhaler    Inhale 1-2 puffs into the lungs every 4 hours as needed for shortness of breath / dyspnea or wheezing    Exercise-induced asthma       fluconazole 150 MG tablet    DIFLUCAN    4 tablet    2 pills once weekly for 2 weeks.    Tinea versicolor       fluticasone 50 MCG/ACT spray    FLONASE    16 g    Spray  1-2 sprays into both nostrils daily    Exercise-induced asthma       multivitamin, therapeutic with minerals Tabs tablet     100 tablet    Take 1 tablet by mouth daily    Major depressive disorder, single episode, moderate (H)

## 2018-10-24 NOTE — PROGRESS NOTES
"   SUBJECTIVE:   CC: Violet Rachel is an 23 year old woman who presents for preventive health visit.     Physical   Annual:     Getting at least 3 servings of Calcium per day:  NO    Bi-annual eye exam:  Yes    Dental care twice a year:  NO    Sleep apnea or symptoms of sleep apnea:  None    Diet:  Other    Frequency of exercise:  None    Taking medications regularly:  No    Barriers to taking medications:  Problems remembering to take them    Additional concerns today:  YES    Patient reports last time she was in she had a rash/ versicolor on her back. She was given oral miconazole. She is requesting STD testing. Notes she has an IUD that expires in Jan; plans to get her pap taken with next insertion.     Patient is requesting refill on Flonase since she has had \"a sinus infection for 3 years that ebs and flows; might be allergies\" that flonase helps with.     States her asthma is controlled; does not have a current albuterol inhaler since she has not needed it. Smoked for a short period of time; she is vapeing now to quit; a few times per day. She states it is social for her; notes quitting is easy for her and does not get cravings.     States she hasn't been on medication for anxiety/depression in 4 years since she feels like it is well controlled. Mood issues are seasonal; is able to distract.     She is wondering about white \"sunspots\" that her mother has as well.     Today's PHQ-2 Score:   PHQ-2 ( 1999 Pfizer) 10/21/2018   Q1: Little interest or pleasure in doing things 1   Q2: Feeling down, depressed or hopeless 1   PHQ-2 Score 2   Q1: Little interest or pleasure in doing things Several days   Q2: Feeling down, depressed or hopeless Several days   PHQ-2 Score 2       Abuse: Current or Past(Physical, Sexual or Emotional)- No  Do you feel safe in your environment - Yes    Social History   Substance Use Topics     Smoking status: Never Smoker     Smokeless tobacco: Never Used     Alcohol use No     Alcohol " Use 10/21/2018   If you drink alcohol do you typically have greater than 3 drinks per day OR greater than 7 drinks per week? No   No flowsheet data found.    Reviewed orders with patient.  Reviewed health maintenance and updated orders accordingly - Yes  Labs reviewed in EPIC  BP Readings from Last 3 Encounters:   10/24/18 118/60   09/20/17 106/58   05/17/17 103/65    Wt Readings from Last 3 Encounters:   10/24/18 55.1 kg (121 lb 6.4 oz)   09/20/17 55.4 kg (122 lb 3.2 oz)   02/18/14 54 kg (119 lb) (33 %)*     * Growth percentiles are based on [facility] 2-20 Years data.                  Patient Active Problem List   Diagnosis     Exercise-induced asthma     Panic attack     Major depressive disorder, single episode, moderate (H)     Tinea versicolor     Common wart     Past Surgical History:   Procedure Laterality Date     NO HISTORY OF SURGERY         Social History   Substance Use Topics     Smoking status: Never Smoker     Smokeless tobacco: Never Used     Alcohol use No     Family History   Problem Relation Age of Onset     Asthma Mother      Neurologic Disorder Maternal Grandmother      parkinson's     Thyroid Disease Maternal Grandmother      Prostate Cancer Maternal Grandfather          Current Outpatient Prescriptions   Medication Sig Dispense Refill     fluticasone (FLONASE) 50 MCG/ACT spray Spray 1-2 sprays into both nostrils daily 16 g 0     multivitamin, therapeutic with minerals (MULTI-VITAMIN) TABS Take 1 tablet by mouth daily 100 tablet 3     albuterol (PROAIR HFA/PROVENTIL HFA/VENTOLIN HFA) 108 (90 BASE) MCG/ACT Inhaler Inhale 1-2 puffs into the lungs every 4 hours as needed for shortness of breath / dyspnea or wheezing (Patient not taking: Reported on 9/20/2017) 1 Inhaler 0     fluconazole (DIFLUCAN) 150 MG tablet Take 2 tablets (300 mg) by mouth once a week 2 tablet 0     sertraline (ZOLOFT) 50 MG tablet Take 1 tablet (50 mg) by mouth daily (Patient not taking: Reported on 5/17/2017) 90 tablet 1      [DISCONTINUED] albuterol (PROAIR HFA, PROVENTIL HFA, VENTOLIN HFA) 108 (90 BASE) MCG/ACT inhaler Inhale 2 puffs into the lungs every 4 hours as needed for shortness of breath / dyspnea. (Patient not taking: Reported on 5/17/2017) 1 Inhaler 0     No Known Allergies    Mammogram not appropriate for this patient based on age.    Pertinent mammograms are reviewed under the imaging tab.  History of abnormal Pap smear: Last 3 Pap Results: No results found for: PAP     Reviewed and updated as needed this visit by clinical staff  Tobacco  Allergies  Meds  Med Hx  Surg Hx  Fam Hx  Soc Hx        Reviewed and updated as needed this visit by Provider            Review of Systems   Constitutional: Negative for chills and fever.   HENT: Positive for congestion and ear pain. Negative for hearing loss and sore throat.    Eyes: Positive for pain and visual disturbance.   Respiratory: Positive for shortness of breath. Negative for cough.    Cardiovascular: Positive for palpitations. Negative for chest pain and peripheral edema.   Gastrointestinal: Positive for constipation. Negative for abdominal pain, diarrhea, heartburn, hematochezia and nausea.   Breasts:  Negative for tenderness, breast mass and discharge.   Genitourinary: Negative for dysuria, frequency, genital sores, hematuria, pelvic pain, urgency, vaginal bleeding and vaginal discharge.   Musculoskeletal: Positive for arthralgias and myalgias. Negative for joint swelling.   Skin: Positive for rash.   Neurological: Positive for headaches. Negative for dizziness, weakness and paresthesias.   Psychiatric/Behavioral: Positive for mood changes. The patient is not nervous/anxious.        This document serves as a record of the services and decisions personally performed and made by Polina Philip MD. It was created on her behalf by Bell Martinez, a trained medical scribe. The creation of this document is based the provider's statements to the medical scribe.    Bell  "Juan October 24, 2018 12:12 PM   OBJECTIVE:   /60 (BP Location: Right arm, Patient Position: Sitting, Cuff Size: Adult Regular)  Pulse 96  Temp 98.8  F (37.1  C) (Oral)  Ht 5' 1.5\" (1.562 m)  Wt 121 lb 6.4 oz (55.1 kg)  SpO2 98%  BMI 22.57 kg/m2  Physical Exam  GENERAL: healthy, alert and no distress  EYES: Eyes grossly normal to inspection, PERRL and conjunctivae and sclerae normal  HENT: ear canals and TM's normal, nose and mouth without ulcers or lesions  NECK: no adenopathy, no asymmetry, masses, or scars and thyroid normal to palpation  RESP: lungs clear to auscultation - no rales, rhonchi or wheezes  CV: regular rate and rhythm, normal S1 S2, no S3 or S4, no murmur, click or rub   ABDOMEN: soft, nontender, no hepatosplenomegaly, no masses and bowel sounds normal  MS: no gross musculoskeletal defects noted, no edema  SKIN: hyperpigmented macules on right flank and on right mid back.   NEURO: Normal strength and tone, mentation intact and speech normal  PSYCH: mentation appears normal, affect normal/bright    Diagnostic Test Results:  No results found for this or any previous visit (from the past 24 hour(s)).    ASSESSMENT/PLAN:   (Z00.00) Routine general medical examination at a health care facility  (primary encounter diagnosis)  -- imm utd; tetanus and flu declined today   -- [facility]   -- encouraged regular exercise and balanced diet         (J45.990) Exercise-induced asthma  -- reported well controlled   -- albuterol inhaler in case of flares   Plan: fluticasone (FLONASE) 50 MCG/ACT spray,         albuterol (PROAIR HFA/PROVENTIL HFA/VENTOLIN         HFA) 108 (90 Base) MCG/ACT inhaler      (B36.0) Tinea versicolor  -- oral diflucan since areas are hard to reach on back   -- rtc if not improving   Plan: fluconazole (DIFLUCAN) 150 MG tablet      (Z11.3) Screen for STD (sexually transmitted disease)  Plan: NEISSERIA GONORRHOEA PCR, CHLAMYDIA " "TRACHOMATIS        PCR    Follow up for annual care or as needed         COUNSELING:  Reviewed preventive health counseling, as reflected in patient instructions       Regular exercise       Healthy diet/nutrition       Vision screening       Contraception       Dental Care    BP Readings from Last 1 Encounters:   10/24/18 118/60     Estimated body mass index is 22.57 kg/(m^2) as calculated from the following:    Height as of this encounter: 5' 1.5\" (1.562 m).    Weight as of this encounter: 121 lb 6.4 oz (55.1 kg).           reports that she has never smoked. She has never used smokeless tobacco.      Counseling Resources:  ATP IV Guidelines  Pooled Cohorts Equation Calculator  Breast Cancer Risk Calculator  FRAX Risk Assessment  ICSI Preventive Guidelines  Dietary Guidelines for Americans, 2010  USDA's MyPlate  ASA Prophylaxis  Lung CA Screening    The information in this document, created by the medical scribe for me, accurately reflects the services I personally performed and the decisions made by me. I have reviewed and approved this document for accuracy prior to leaving the patient care area.  Polina Philip MD  [facility] [place]  Answers for HPI/ROS submitted by the patient on 10/21/2018   PHQ-2 Score: 2    "

## 2018-10-24 NOTE — LETTER
My Asthma Action Plan  Name: Violet Rachel   YOB: 1995  Date: 10/24/2018   My doctor: Polina Philip MD   My clinic: [facility]        My Control Medicine: None  My Rescue Medicine: Albuterol (Proair/Ventolin/Proventil) inhaler prn   My Asthma Severity: intermittent  Avoid your asthma triggers: upper respiratory infections               GREEN ZONE   Good Control    I feel good    No cough or wheeze    Can work, sleep and play without asthma symptoms       Take your asthma control medicine every day.     1. If exercise triggers your asthma, take your rescue medication    15 minutes before exercise or sports, and    During exercise if you have asthma symptoms  2. Spacer to use with inhaler: If you have a spacer, make sure to use it with your inhaler             YELLOW ZONE Getting Worse  I have ANY of these:    I do not feel good    Cough or wheeze    Chest feels tight    Wake up at night   1. Keep taking your Green Zone medications  2. Start taking your rescue medicine:    every 20 minutes for up to 1 hour. Then every 4 hours for 24-48 hours.  3. If you stay in the Yellow Zone for more than 12-24 hours, contact your doctor.  4. If you do not return to the Green Zone in 12-24 hours or you get worse, start taking your oral steroid medicine if prescribed by your provider.           RED ZONE Medical Alert - Get Help  I have ANY of these:    I feel awful    Medicine is not helping    Breathing getting harder    Trouble walking or talking    Nose opens wide to breathe       1. Take your rescue medicine NOW  2. If your provider has prescribed an oral steroid medicine, start taking it NOW  3. Call your doctor NOW  4. If you are still in the Red Zone after 20 minutes and you have not reached your doctor:    Take your rescue medicine again and    Call 911 or go to the emergency room right away    See your regular doctor within 2 weeks of an Emergency Room or Urgent Care visit for  follow-up treatment.          Annual Reminders:  Meet with Asthma Educator,  Flu Shot in the Fall, consider Pneumonia Vaccination for patients with asthma (aged 19 and older).    Pharmacy:    [facility]  [id number] [place], [place] [facility] [address] [facility] [address]  [facility] - [place], [place] - [address]  [facility] - [place], [place] - [address]  [facility] - [place], [place] - [address] [facility] [address] [place], [place] - [address] AT [facility]                      Asthma Triggers  How To Control Things That Make Your Asthma Worse    Triggers are things that make your asthma worse.  Look at the list below to help you find your triggers and what you can do about them.  You can help prevent asthma flare-ups by staying away from your triggers.      Trigger                                                          What you can do   Cigarette Smoke  Tobacco smoke can make asthma worse. Do not allow smoking in your home, car or around you.  Be sure no one smokes at a child s day care or school.  If you smoke, ask your health care provider for ways to help you quit.  Ask family members to quit too.  Ask your health care provider for a referral to Quit Plan to help you quit smoking, or call 9-813-193-PLAN.     Colds, Flu, Bronchitis  These are common triggers of asthma. Wash your hands often.  Don t touch your eyes, nose or mouth.  Get a flu shot every year.     Dust Mites  These are tiny bugs that live in cloth or carpet. They are too small to see. Wash sheets and blankets in hot water every week.   Encase pillows and mattress in dust mite proof covers.  Avoid having carpet if you can. If you have carpet, vacuum weekly.   Use a dust mask and HEPA vacuum.   Pollen and Outdoor Mold  Some people are allergic to trees, grass, or weed pollen, or molds. Try to keep your windows closed.  Limit time out doors when pollen count is high.   Ask you health care  provider about taking medicine during allergy season.     Animal Dander  Some people are allergic to skin flakes, urine or saliva from pets with fur or feathers. Keep pets with fur or feathers out of your home.    If you can t keep the pet outdoors, then keep the pet out of your bedroom.  Keep the bedroom door closed.  Keep pets off cloth furniture and away from stuffed toys.     Mice, Rats, and Cockroaches  Some people are allergic to the waste from these pests.   Cover food and garbage.  Clean up spills and food crumbs.  Store grease in the refrigerator.   Keep food out of the bedroom.   Indoor Mold  This can be a trigger if your home has high moisture. Fix leaking faucets, pipes, or other sources of water.   Clean moldy surfaces.  Dehumidify basement if it is damp and smelly.   Smoke, Strong Odors, and Sprays  These can reduce air quality. Stay away from strong odors and sprays, such as perfume, powder, hair spray, paints, smoke incense, paint, cleaning products, candles and new carpet.   Exercise or Sports  Some people with asthma have this trigger. Be active!  Ask your doctor about taking medicine before sports or exercise to prevent symptoms.    Warm up for 5-10 minutes before and after sports or exercise.     Other Triggers of Asthma  Cold air:  Cover your nose and mouth with a scarf.  Sometimes laughing or crying can be a trigger.  Some medicines and food can trigger asthma.

## 2018-10-25 LAB
C TRACH DNA SPEC QL NAA+PROBE: NEGATIVE
N GONORRHOEA DNA SPEC QL NAA+PROBE: NEGATIVE
SPECIMEN SOURCE: NORMAL
SPECIMEN SOURCE: NORMAL

## 2019-10-03 ENCOUNTER — HEALTH MAINTENANCE LETTER (OUTPATIENT)
Age: 24
End: 2019-10-03

## 2019-12-16 ENCOUNTER — HEALTH MAINTENANCE LETTER (OUTPATIENT)
Age: 24
End: 2019-12-16

## 2021-01-15 ENCOUNTER — HEALTH MAINTENANCE LETTER (OUTPATIENT)
Age: 26
End: 2021-01-15

## 2021-09-05 ENCOUNTER — HEALTH MAINTENANCE LETTER (OUTPATIENT)
Age: 26
End: 2021-09-05

## 2021-11-03 ENCOUNTER — OFFICE VISIT (OUTPATIENT)
Dept: FAMILY MEDICINE | Facility: CLINIC | Age: 26
End: 2021-11-03
Payer: COMMERCIAL

## 2021-11-03 VITALS
DIASTOLIC BLOOD PRESSURE: 78 MMHG | HEIGHT: 61 IN | SYSTOLIC BLOOD PRESSURE: 123 MMHG | RESPIRATION RATE: 14 BRPM | BODY MASS INDEX: 25.11 KG/M2 | WEIGHT: 133 LBS | TEMPERATURE: 98.7 F | HEART RATE: 91 BPM

## 2021-11-03 DIAGNOSIS — Z86.59 HISTORY OF DEPRESSION: ICD-10-CM

## 2021-11-03 DIAGNOSIS — R10.31 ABDOMINAL PAIN, RIGHT LOWER QUADRANT: ICD-10-CM

## 2021-11-03 DIAGNOSIS — R14.0 ABDOMINAL BLOATING: ICD-10-CM

## 2021-11-03 DIAGNOSIS — J45.909 ASTHMA, CHRONIC, UNSPECIFIED ASTHMA SEVERITY, UNCOMPLICATED: ICD-10-CM

## 2021-11-03 DIAGNOSIS — F33.9 EPISODE OF RECURRENT MAJOR DEPRESSIVE DISORDER, UNSPECIFIED DEPRESSION EPISODE SEVERITY (H): ICD-10-CM

## 2021-11-03 DIAGNOSIS — Q30.8: ICD-10-CM

## 2021-11-03 DIAGNOSIS — R10.2 PELVIC PAIN IN FEMALE: ICD-10-CM

## 2021-11-03 LAB
ERYTHROCYTE [DISTWIDTH] IN BLOOD BY AUTOMATED COUNT: 12 % (ref 10–15)
HCT VFR BLD AUTO: 42.4 % (ref 35–47)
HGB BLD-MCNC: 14.3 G/DL (ref 11.7–15.7)
MCH RBC QN AUTO: 31.3 PG (ref 26.5–33)
MCHC RBC AUTO-ENTMCNC: 33.7 G/DL (ref 31.5–36.5)
MCV RBC AUTO: 93 FL (ref 78–100)
PLATELET # BLD AUTO: 267 10E3/UL (ref 150–450)
RBC # BLD AUTO: 4.57 10E6/UL (ref 3.8–5.2)
WBC # BLD AUTO: 6.4 10E3/UL (ref 4–11)

## 2021-11-03 PROCEDURE — 36415 COLL VENOUS BLD VENIPUNCTURE: CPT | Performed by: FAMILY MEDICINE

## 2021-11-03 PROCEDURE — 99204 OFFICE O/P NEW MOD 45 MIN: CPT | Performed by: FAMILY MEDICINE

## 2021-11-03 PROCEDURE — 96127 BRIEF EMOTIONAL/BEHAV ASSMT: CPT | Performed by: FAMILY MEDICINE

## 2021-11-03 PROCEDURE — 83516 IMMUNOASSAY NONANTIBODY: CPT | Performed by: FAMILY MEDICINE

## 2021-11-03 PROCEDURE — 80053 COMPREHEN METABOLIC PANEL: CPT | Performed by: FAMILY MEDICINE

## 2021-11-03 PROCEDURE — 85027 COMPLETE CBC AUTOMATED: CPT | Performed by: FAMILY MEDICINE

## 2021-11-03 PROCEDURE — 84443 ASSAY THYROID STIM HORMONE: CPT | Performed by: FAMILY MEDICINE

## 2021-11-03 RX ORDER — SERTRALINE HYDROCHLORIDE 25 MG/1
TABLET, FILM COATED ORAL
Qty: 49 TABLET | Refills: 0 | Status: SHIPPED | OUTPATIENT
Start: 2021-11-03 | End: 2021-12-01

## 2021-11-03 RX ORDER — ALBUTEROL SULFATE 90 UG/1
2 AEROSOL, METERED RESPIRATORY (INHALATION) EVERY 4 HOURS PRN
Qty: 18 G | Refills: 2 | Status: SHIPPED | OUTPATIENT
Start: 2021-11-03

## 2021-11-03 ASSESSMENT — MIFFLIN-ST. JEOR: SCORE: 1279.62

## 2021-11-03 ASSESSMENT — PATIENT HEALTH QUESTIONNAIRE - PHQ9
10. IF YOU CHECKED OFF ANY PROBLEMS, HOW DIFFICULT HAVE THESE PROBLEMS MADE IT FOR YOU TO DO YOUR WORK, TAKE CARE OF THINGS AT HOME, OR GET ALONG WITH OTHER PEOPLE: EXTREMELY DIFFICULT
SUM OF ALL RESPONSES TO PHQ QUESTIONS 1-9: 19
SUM OF ALL RESPONSES TO PHQ QUESTIONS 1-9: 19

## 2021-11-03 NOTE — PROGRESS NOTES
Assessment & Plan   See after visit summary and result note from studies for helpful information and advice given to patient.    Episode of recurrent major depressive disorder, unspecified depression episode severity (H)  Hopefully, treatment of patient's depression will result in improvement of her physical symptoms.  - MENTAL HEALTH REFERRAL  - Adult; Outpatient Treatment; Individual/Couples/Family/Group Therapy/Health Psychology; [facility] [phone]; We will contact you to schedule the appointment or please call with any questions  - sertraline (ZOLOFT) 25 MG tablet  Dispense: 49 tablet; Refill: 0  - TSH with free T4 reflex  - sertraline (ZOLOFT) 50 MG tablet  Dispense: 30 tablet; Refill: 0    Asthma, chronic, unspecified asthma severity, uncomplicated  Patient given the peak flow meter to take home and use before and after treatment using albuterol.  We can review her symptoms and peak flow readings at future exam to assess if she should be on a controller medication.  - albuterol (PROAIR HFA/PROVENTIL HFA/VENTOLIN HFA) 108 (90 Base) MCG/ACT inhaler  Dispense: 18 g; Refill: 2    History of depression    - MENTAL HEALTH REFERRAL  - Adult; Outpatient Treatment; Individual/Couples/Family/Group Therapy/Health Psychology; [facility] [phone]; We will contact you to schedule the appointment or please call with any questions  - sertraline (ZOLOFT) 25 MG tablet  Dispense: 49 tablet; Refill: 0    Abdominal bloating    - Adult Gastro Ref - Consult Only  - Tissue transglutaminase zina IgA and IgG  - CBC with platelets  - Comprehensive metabolic panel (BMP + Alb, Alk Phos, ALT, AST, Total. Bili, TP)    Abdominal pain, right lower quadrant    - Adult Gastro Ref - Consult Only  - CBC with platelets    Enlargement of nostril    - Otolaryngology Referral    Pelvic pain in female    - POC US PELVIC NON-OB LIMITED        45 minutes spent on the date of the encounter doing chart review,  history and exam, documentation and further activities per the note       Depression Screening Follow Up    PHQ 11/3/2021   PHQ-9 Total Score 19   Q9: Thoughts of better off dead/self-harm past 2 weeks Nearly every day   F/U: Thoughts of suicide or self-harm Yes   F/U: Self harm-plan Yes   F/U: Self-harm action No   F/U: Safety concerns No                 Follow Up      Follow Up Actions Taken  Mental Health Referral placed    Return in about 1 month (around 12/3/2021) for Follow up.    Geovanni Layne DO  [facility] AILYN Lazo is a 26 year old who presents for the following health issues     History of Present Illness       She eats 0-1 servings of fruits and vegetables daily.She consumes 1 sweetened beverage(s) daily.She exercises with enough effort to increase her heart rate 30 to 60 minutes per day.  She exercises with enough effort to increase her heart rate 6 days per week.   She is taking medications regularly.         Concern - swollen nostril for a year abdominal pressure  Onset: pressure for about a year  Description: some pain and bloating mostly on the right side.   Intensity: mild  Progression of Symptoms:  same  Accompanying Signs & Symptoms: diarrhea a times.  Previous history of similar problem: yes  Precipitating factors:        Worsened by: sinus infection  Alleviating factors:        Improved by: none  Therapies tried and outcome: exercise and diet did not help    Mental health depression concerns. Review phq 9.        Review of Systems   Check in questions and answers reviewed. Patient is seen for multiple concerns.    Patient has sensation of swelling near nostrils interfering with inhaling through both sides of nose.  This has been a problem for years.  She feels it is due to swelling on the insides of both nostrils.    Patient has bilateral lower abd bloating, with discomfort in right lower abdomen. It is worse with dairy consumption, and gluten containing  "foods.     She had an IUD removed through [facility] about 4 weeks ago. No change in abdominal symptoms with IUD removal.     Patient has a BM 1-3 times/day. She has looser stools 1-2 times/weeks. No dark or bloody stools. Periodic diarrhea has been a problem for years.     Patient feels some shortness of breath with longer conversations.  She feels it seems to be related to mask use.     Her last menstrual period other then rare spotting was \"many years ago\". She got a depo injection about a month ago. Her depression is worse since then.     She was treated with Zoloft in the past, and after several lifestyle changes and treatment with Zoloft, her depression improved.  She is interested in restarting treatment with this medication.    She is living with  and donon.  She spoke to me about how the relationship between her stepson and her is very stressful.    She has had daily thoughts of self harm for years, worsening over the past 6 months.  At time of exam, she does not have a plan about how to harm herself.    Patient works as a [employer].        Objective    /78 (BP Location: Right arm, Patient Position: Sitting, Cuff Size: Adult Regular)   Pulse 91   Temp 98.7  F (37.1  C) (Oral)   Resp 14   Ht 1.556 m (5' 1.25\")   Wt 60.3 kg (133 lb)   Breastfeeding No   BMI 24.93 kg/m    Body mass index is 24.93 kg/m .  Physical Exam   Vital signs reviewed.  Patient is in mild acute appearing emotional distress due to feeling sad when speaking about the difficulties getting along with her stepson.  Breathing appears nonlabored.  Patient is alert and oriented ×3.  Patient is very pleasant in conversation, making good eye contact and responding with clear fluent speech.    ENT: Ear exam shows bilateral tympanic membranes to be clear without injection, nasal turbinates show no injection or edema, no pharyngeal injection or exudate.  Focused examination of the nostrils is notable for soft tissue " growth on the mucosal side bilaterally protruding towards distal septum.  When patient breathes in through her nose, you can see from outside her nostrils retracted medially and closing.    Neck: supple with no adenoapthy, palpable abnormal masses, or thyroid abnormality.    Heart: Heart rate is regular without murmur.    Lungs: Lungs are clear to auscultation with good airflow bilaterally.    Abdomen:  Abdomen is soft, nontender.  No palpable abnormal masses or organomegaly.  Bowel sounds are normal.    Back: No areas of tenderness.    Skin/extremities: Warm and dry, with no lower leg edema.    Peak Flow Meter: 350/350/375 Ideal is about 471    Results for orders placed or performed in visit on 11/03/21   Tissue transglutaminase zina IgA and IgG     Status: Normal   Result Value Ref Range    Tissue Transglutaminase Antibody IgA 0.6 <7.0 U/mL    Tissue Transglutaminase Antibody IgG 0.7 <7.0 U/mL   TSH with free T4 reflex     Status: Normal   Result Value Ref Range    TSH 2.21 0.40 - 4.00 mU/L   CBC with platelets     Status: Normal   Result Value Ref Range    WBC Count 6.4 4.0 - 11.0 10e3/uL    RBC Count 4.57 3.80 - 5.20 10e6/uL    Hemoglobin 14.3 11.7 - 15.7 g/dL    Hematocrit 42.4 35.0 - 47.0 %    MCV 93 78 - 100 fL    MCH 31.3 26.5 - 33.0 pg    MCHC 33.7 31.5 - 36.5 g/dL    RDW 12.0 10.0 - 15.0 %    Platelet Count 267 150 - 450 10e3/uL   Comprehensive metabolic panel (BMP + Alb, Alk Phos, ALT, AST, Total. Bili, TP)     Status: Abnormal   Result Value Ref Range    Sodium 141 133 - 144 mmol/L    Potassium 3.8 3.4 - 5.3 mmol/L    Chloride 110 (H) 94 - 109 mmol/L    Carbon Dioxide (CO2) 22 20 - 32 mmol/L    Anion Gap 9 3 - 14 mmol/L    Urea Nitrogen 16 7 - 30 mg/dL    Creatinine 0.78 0.52 - 1.04 mg/dL    Calcium 9.2 8.5 - 10.1 mg/dL    Glucose 87 70 - 99 mg/dL    Alkaline Phosphatase 48 40 - 150 U/L    AST 15 0 - 45 U/L    ALT 31 0 - 50 U/L    Protein Total 7.6 6.8 - 8.8 g/dL    Albumin 4.4 3.4 - 5.0 g/dL    Bilirubin  Total 0.2 0.2 - 1.3 mg/dL    GFR Estimate >90 >60 mL/min/1.73m2   Lab results not available for review at time of exam.  Please see result note.            Answers for HPI/ROS submitted by the patient on 11/3/2021  If you checked off any problems, how difficult have these problems made it for you to do your work, take care of things at home, or get along with other people?: Extremely difficult  PHQ9 TOTAL SCORE: 19

## 2021-11-03 NOTE — PATIENT INSTRUCTIONS
Use the peak flow meter when you feel asthma symptoms, then repeat after treatment with albuterol to assess treatment affects. We can discuss asthma treatment further at next visit. Go ER if any active plan to harm self. You will need to call the [place] GI clinic and ENT clinic for appointment. I will review your studies via [employer].   Patient Education     Depression: Tips to Help Yourself    As your healthcare providers help treat your depression, you can also help yourself. Keep in mind that your illness affects you emotionally, physically, mentally, and socially. So full recovery will take time. Take care of your body and your soul, and be patient with yourself as you get better.  Self-care    Educate yourself. Read about treatment and medicine options. If you have the energy, attend local conferences or support groups. Keep a list of useful websites and helpful books and use them as needed. This illness is not your fault. Don t blame yourself for your depression.    Manage early symptoms. If you notice symptoms returning, experience triggers, or identify other factors that may lead to a depressive episode, get help as soon as possible. Ask trusted friends and family to monitor your behavior and let you know if they see anything of concern.    Work with your provider. Find a provider you can trust. Communicate honestly with that person and share information on your treatment for depression and your reaction to medicines.    Be prepared for a crisis. Know what to do if you experience a crisis. Keep the phone number of a crisis hotline and know the location of your community's urgent care centers and the closest emergency department.    Hold off on big decisions. Depression can cloud your judgment. So wait until you feel better before making major life decisions, such as changing jobs, moving, or getting  or .    Be patient. Recovering from depression is a process. Don t be discouraged if it takes some  time to feel better.    Keep it simple. Depression saps your energy and concentration. So you won t be able to do all the things you used to do. Set small goals and do what you can.    Be with others. Don t isolate yourself--you ll only feel worse. Try to be with other people. And take part in fun activities when you can. Go to a movie, ballgame, Yazidi service, or social event. Talk openly with people you can trust. And accept help when it s offered.    Take care of your body  People with depression often lose the desire to take care of themselves. That only makes their problems worse. During treatment and afterward, make a point to:    Exercise. It s a great way to take care of your body. And studies have shown that exercise helps fight depression. Aim for 30 minutes of moderate activity a day. Walking in small blocks of time (5-10 minutes) is a good way to start, but anything that gets you moving (gardening, house cleaning) counts.    Don't use drugs and alcohol. These may ease the pain in the short term. But they ll only make your problems worse in the long run.    Get relief from stress. Ask your healthcare provider for relaxation exercises and techniques to help relieve stress. Consider activities like meditation, yoga, or Antony Chi.    Eat right. A balanced and healthy diet helps keep your body healthy.    Get adequate sleep. Aim for 8 hours per night. Too much or too little sleep can cause other physical and emotional problems.  [employer] last reviewed this educational content on 12/1/2019 2000-2021 [employer]. All rights reserved. This information is not intended as a substitute for professional medical care. Always follow your healthcare professional's instructions.           Patient Education     Counseling for Depression  For some people, counseling can work as well as medicine for mild to moderate depression. Counseling is also called talk therapy. When done by a trained professional,  this treatment is a powerful way to better understand your thoughts and feelings. Like medicine, it may take time before you notice how much counseling is helping.     Kinds of talk therapy  Different counselors use different methods for talk therapy. But all therapy aims to help change how you think about your problem. Most therapy for depression is often done one-on-one. But it may also be done in a group setting. You and your healthcare provider can discuss the type of therapy you think would work best for you. You can also discuss who the best person is to provide the therapy.   How therapy helps  Talking about your problems can help them seem less overwhelming. It can help work through problems you have with your life and your relationships. It can also help you understand how depression is clouding your thinking, not letting you see the world the way it really is. Therapy can give you:     Insight about your emotions    New tools for dealing with your problems    Emotional support for making progress  Getting better takes time  Talk therapy can help you feel better. But change doesn t happen right away. Depression takes away your energy and motivation. So it can be hard to feel like going to therapy and sticking with it. But therapy has been proven to be very valuable in the treatment of depression. Therapy for depression is often done for a set number of sessions. In other cases, you and your therapist decide together at what point you no longer need therapy.   Additional sources of help  In addition to a professional counselor, it may help to talk to other people in your life. You may find support and insight from:     A close friend or family member    A  trained in counseling    A local support group or community group    A 12-step program such as Alcoholics Anonymous for dealing with problems that can contribute to depression, such as alcohol or drug addiction  [employer] last reviewed this  educational content on 9/1/2019 2000-2021 [employer]. All rights reserved. This information is not intended as a substitute for professional medical care. Always follow your healthcare professional's instructions.           Patient Education     Asthma (Adult)   Asthma is a disease where the medium and small air passages in the lung go into spasm and restrict air flow. Inflammation and swelling of the airways cause further blockage. During an acute asthma attack, these factors cause trouble breathing, wheezing, cough, and chest tightness.     An asthma attack can be triggered by many things. Common triggers include infections such as the common cold, bronchitis, and pneumonia. Irritants such as smoke or pollutants in the air, very cold air, emotional upset, and exercise can also trigger an attack. In many adults with asthma, allergies to dust, mold, pollen, and animal dander can cause an asthma attack. Skipping doses of daily asthma medicine can also bring on an asthma attack.   Asthma can be controlled using the correct medicines prescribed by your healthcare provider and staying away from known triggers including allergens and irritants.   Home care    Take prescribed medicine exactly at the times advised. If you need medicine such as from a handheld inhaler or aerosol breathing machine more than every 4 hours, contact your healthcare provider or get medical care right away. If you are prescribed an antibiotic or prednisone, take all of the medicine as prescribed. Keep taking it even if you are feeling better after a few days.    Don't smoke. Stay away from the smoke of others.    Some people with asthma find their symptoms get worse when they take aspirin and non-steroidal or fever-reducing medicines such as ibuprofen and naproxen. Talk with your healthcare provider if you think this may apply to you.    Follow-up care  Follow up with your healthcare provider, or as advised. Always bring all of  your current medicines to any appointments with your healthcare provider. Also bring a complete list of medicines, even those not taken for asthma. If you don't already have one, talk with your healthcare provider about making your own Asthma Action Plan.   A pneumonia (pneumococcal) vaccine and yearly flu shot (every fall) are advised. Ask your provider about this.   When to get medical advice  Call your healthcare provider or get medical care right away if any of these occur:      More wheezing or shortness of breath    Need to use your inhalers more often than normal without relief    Fever of 100.4 F (38 C) or higher, or as directed by your provider    Coughing up lots of dark-colored or bloody sputum (mucus)    Chest pain with each breath    If you use a peak flow meter as part of an Asthma Action Plan, and you are still in the yellow zone (50% to 80%) 15 minutes after using inhaler medicine.  Call 911  Call 911 if any of these occur:     Trouble walking or talking because you are short of breath    If you use a peak flow meter as part of an Asthma Action Plan, and you are still in the red zone (less than 50%) 15 minutes after using inhaler medicine    Lips or fingernails turn gray, purple, or blue    Feeling faint or loss of consciousness  [employer] last reviewed this educational content on 10/1/2019    5711-7219 [employer]. All rights reserved. This information is not intended as a substitute for professional medical care. Always follow your healthcare professional's instructions.

## 2021-11-04 ENCOUNTER — TELEPHONE (OUTPATIENT)
Dept: FAMILY MEDICINE | Facility: CLINIC | Age: 26
End: 2021-11-04
Payer: COMMERCIAL

## 2021-11-04 LAB
ALBUMIN SERPL-MCNC: 4.4 G/DL (ref 3.4–5)
ALP SERPL-CCNC: 48 U/L (ref 40–150)
ALT SERPL W P-5'-P-CCNC: 31 U/L (ref 0–50)
ANION GAP SERPL CALCULATED.3IONS-SCNC: 9 MMOL/L (ref 3–14)
AST SERPL W P-5'-P-CCNC: 15 U/L (ref 0–45)
BILIRUB SERPL-MCNC: 0.2 MG/DL (ref 0.2–1.3)
BUN SERPL-MCNC: 16 MG/DL (ref 7–30)
CALCIUM SERPL-MCNC: 9.2 MG/DL (ref 8.5–10.1)
CHLORIDE BLD-SCNC: 110 MMOL/L (ref 94–109)
CO2 SERPL-SCNC: 22 MMOL/L (ref 20–32)
CREAT SERPL-MCNC: 0.78 MG/DL (ref 0.52–1.04)
GFR SERPL CREATININE-BSD FRML MDRD: >90 ML/MIN/1.73M2
GLUCOSE BLD-MCNC: 87 MG/DL (ref 70–99)
POTASSIUM BLD-SCNC: 3.8 MMOL/L (ref 3.4–5.3)
PROT SERPL-MCNC: 7.6 G/DL (ref 6.8–8.8)
SODIUM SERPL-SCNC: 141 MMOL/L (ref 133–144)
TSH SERPL DL<=0.005 MIU/L-ACNC: 2.21 MU/L (ref 0.4–4)

## 2021-11-04 ASSESSMENT — ASTHMA QUESTIONNAIRES: ACT_TOTALSCORE: 18

## 2021-11-04 ASSESSMENT — PATIENT HEALTH QUESTIONNAIRE - PHQ9: SUM OF ALL RESPONSES TO PHQ QUESTIONS 1-9: 19

## 2021-11-04 NOTE — TELEPHONE ENCOUNTER
Central Prior Authorization Team   Phone: [phone]      Prior Authorization Not Needed per Insurance    11/04/2021  Medication: sertraline (ZOLOFT) 25 MG tablet - NOT NEEDED  Insurance Company: [employer] - Phone [phone] Fax [phone]  Expected CoPay:      Pharmacy Filling the Rx: [id number] [place], [place] - [address] to [facility] [address]  Pharmacy Notified: Yes  Patient Notified: Yes (**Instructed pharmacy to notify patient when script is ready to /ship.**)

## 2021-11-04 NOTE — TELEPHONE ENCOUNTER
Prior Authorization Retail Medication Request    Medication/Dose: sertraline (ZOLOFT) 25 MG tablet  ICD code (if different than what is on RX):    Previously Tried and Failed:  None  Rationale:  Patient has been taking this medication since 01/13/2014 and remains stable while on this medication.     Insurance Name:  [employer]  Insurance ID:  [id number]      Pharmacy Information (if different than what is on RX)  Name:    Phone:        Sohail LAURA

## 2021-11-05 LAB
TTG IGA SER-ACNC: 0.6 U/ML
TTG IGG SER-ACNC: 0.7 U/ML

## 2021-11-29 ENCOUNTER — DOCUMENTATION ONLY (OUTPATIENT)
Dept: BEHAVIORAL HEALTH | Facility: CLINIC | Age: 26
End: 2021-11-29
Payer: COMMERCIAL

## 2021-11-29 NOTE — PROGRESS NOTES
[facility]  Behavioral Health Clinician Triage Service    [facility]:  [facility] Follow-up to PHQ 11/3/2021 11/28/2021   PHQ-9 9. Suicide Ideation past 2 weeks Nearly every day Nearly every day   Thoughts of suicide or self harm in past 2 weeks Yes Yes   Thoughts of suicide or self harm in past 2 weeks Yes Yes   PHQ-9 Self harm plan? Yes Yes   PHQ-9 Self harm action? No No   PHQ-9 Safety concerns? No No   PHQ-9 Self harm plan? Yes Yes   PHQ-9 Self harm action? No No   PHQ-9 Safety concerns? No No        1st Outreach Date: November 29, 2021 Time: 9 :24 am  Outcome: Completed phone conversation / triage service.  See assessment and disposition below.  SHITAL Cheung    Hi my name is SHITAL Warren.  I m calling from  [facility] [place] to follow-up on a questionnaire you completed on [facility].      If it s ok I d like review a few of your symptoms/responses and a talk briefly  about how you have been doing to see if I might be able to provide some support and guidance.       Address/Location of patient: Home  Have any supportive people near them?     Risk Assessment:  Patient has had a history of suicidal ideation: onset as a teen and denies a history of suicide attempts, self-injurious behavior, homicidal ideation, homicidal behavior and and other safety concerns  Patient reports the following current or recent suicidal ideation or behaviors: no active thoughts in the past 2 weeks.  Endorsed thoughts in the past 2-3 weeks where she thought about killing self with a gun.  Patient denied having a plan or starting to plan out details of an attempt.   Patient denies current or recent homicidal ideation or behaviors.  Patient denies current or recent self injurious behavior or ideation.  Patient denies other safety concerns.  Patient reports there are firearms in the house. The firearms are secured in a locked space  Protective Factors Sense of responsibility to family and  Positive problem-solving skills   Risk Factors Current high stress and Intense emotionality    ASQ Assessment:  1. In the past few weeks, have you wished you were dead?  Yes:  Patient reports persistent/reoccuring thoughts   2. In the past few weeks, have you felt that you or your family would be better off if you         were dead?  Yes: .  3. In the past week, have you been having thoughts about killing yourself?  No, last had active thoughts 2 weeks ago.    4. Have you ever tried to kill yourself?  No    Disposition:    - Recommendations / Safety Plan:  Patient reported history of chronic/persistent SI since childhood. She stated that she had scheduled to start therapy today, but had to cancel due to being uncertain if provider was in network. Reviewed who patient was scheduled to see, and confirmed that as a SW, the therapist would be billing under their clinical supervisor who likely was in network.  Patient stated that she can scheduled with them due to availability and wanting to start therapy more quickly.   Discussed role of the [facility], with options to schedule with this writer, with patient accepting [facility] episode of care while waiting to transfer to long-term therapist.    Patient accepting of crisis resources sent to her via [employer]. She stated that she is open with her  about her SI, and feels like she can tell him how she is doing/ feeling.     Routing to PCP as a FYI.     Meredith Quintanilla, [facility]  Behavioral Health Clinician

## 2021-12-10 ENCOUNTER — VIRTUAL VISIT (OUTPATIENT)
Dept: BEHAVIORAL HEALTH | Facility: CLINIC | Age: 26
End: 2021-12-10
Payer: COMMERCIAL

## 2021-12-10 DIAGNOSIS — F32.A DEPRESSION, UNSPECIFIED DEPRESSION TYPE: Primary | ICD-10-CM

## 2021-12-10 DIAGNOSIS — F41.9 ANXIETY DISORDER, UNSPECIFIED TYPE: ICD-10-CM

## 2021-12-10 PROCEDURE — 90834 PSYTX W PT 45 MINUTES: CPT | Mod: 95 | Performed by: SOCIAL WORKER

## 2021-12-10 ASSESSMENT — COLUMBIA-SUICIDE SEVERITY RATING SCALE - C-SSRS
4. HAVE YOU HAD THESE THOUGHTS AND HAD SOME INTENTION OF ACTING ON THEM?: NO
5. HAVE YOU STARTED TO WORK OUT OR WORKED OUT THE DETAILS OF HOW TO KILL YOURSELF? DO YOU INTEND TO CARRY OUT THIS PLAN?: NO
TOTAL  NUMBER OF ABORTED OR SELF INTERRUPTED ATTEMPTS PAST 3 MONTHS: NO
REASONS FOR IDEATION LIFETIME: COMPLETELY TO END OR STOP THE PAIN (YOU COULDN'T GO ON LIVING WITH THE PAIN OR HOW YOU WERE FEELING)
4. HAVE YOU HAD THESE THOUGHTS AND HAD SOME INTENTION OF ACTING ON THEM?: NO
ATTEMPT PAST THREE MONTHS: NO
2. HAVE YOU ACTUALLY HAD ANY THOUGHTS OF KILLING YOURSELF LIFETIME?: YES
1. IN THE PAST MONTH, HAVE YOU WISHED YOU WERE DEAD OR WISHED YOU COULD GO TO SLEEP AND NOT WAKE UP?: YES
TOTAL  NUMBER OF ABORTED OR SELF INTERRUPTED ATTEMPTS PAST LIFETIME: NO
ATTEMPT LIFETIME: NO
6. HAVE YOU EVER DONE ANYTHING, STARTED TO DO ANYTHING, OR PREPARED TO DO ANYTHING TO END YOUR LIFE?: NO
TOTAL  NUMBER OF INTERRUPTED ATTEMPTS PAST 3 MONTHS: NO
TOTAL  NUMBER OF INTERRUPTED ATTEMPTS LIFETIME: NO
3. HAVE YOU BEEN THINKING ABOUT HOW YOU MIGHT KILL YOURSELF?: YES
2. HAVE YOU ACTUALLY HAD ANY THOUGHTS OF KILLING YOURSELF?: YES
REASONS FOR IDEATION PAST MONTH: COMPLETELY TO END OR STOP THE PAIN (YOU COULDN'T GO ON LIVING WITH THE PAIN OR HOW YOU WERE FEELING)
1. IN THE PAST MONTH, HAVE YOU WISHED YOU WERE DEAD OR WISHED YOU COULD GO TO SLEEP AND NOT WAKE UP?: YES
5. HAVE YOU STARTED TO WORK OUT OR WORKED OUT THE DETAILS OF HOW TO KILL YOURSELF? DO YOU INTEND TO CARRY OUT THIS PLAN?: NO
6. HAVE YOU EVER DONE ANYTHING, STARTED TO DO ANYTHING, OR PREPARED TO DO ANYTHING TO END YOUR LIFE?: NO

## 2021-12-10 NOTE — PROGRESS NOTES
[facility]  December 10, 2021    Behavioral Health Clinician Progress Note    Patient Name: Violet Rachel      Telemedicine Visit: The patient's condition can be safely assessed and treated via synchronous audio and visual telemedicine encounter.      Reason for Telemedicine Visit: Services only offered telehealth    Originating Site (Patient Location): Patient's home    Distant Site (Provider Location): [facility]: Maternal Fetal Medicine    Consent:  The patient/guardian has verbally consented to: the potential risks and benefits of telemedicine (video visit) versus in person care; bill my insurance or make self-payment for services provided; and responsibility for payment of non-covered services.     Mode of Communication:  Video Conference via [employer]    As the provider I attest to compliance with applicable laws and regulations related to telemedicine.         Service Type:  Individual     Session Start Time: 12:05 pm  Session End Time: 12:57 pm      Session Length: 38 - 52      Attendees: Patient    Visit Activities (Refresh list every visit): [facility] Only    Diagnostic Assessment Date: In process of collection. Deferred to complete safety history/assessment  Treatment Plan Review Date: To be completed after DA  See Flowsheets for today's PHQ-9 and ELKIN-7 results  Previous PHQ-9:   PHQ-9 SCORE 2/18/2014 11/3/2021 11/28/2021   PHQ-9 Total Score 9 - -   PHQ-9 Total Score MyChart - 19 (Moderately severe depression) 14 (Moderate depression)   PHQ-9 Total Score - 19 14     Previous ELKIN-7:   ELKIN-7 SCORE 1/17/2014 2/18/2014 11/28/2021   Total Score 12 6 -   Total Score - - 6 (mild anxiety)   Total Score - - 6       SHANTELL LEVEL:  SHANTELL Score (Last Two) 12/19/2012 11/3/2021   SHANTELL Raw Score 29 22   Activation Score 36 37.2   SHANTELL Level 1 1       DATA  Extended Session (60+ minutes): No  Interactive Complexity: No  Crisis: No  [facility]  Patient: No    Treatment Objective(s) Addressed in This Session:  Target Behavior(s): disease management/lifestyle changes , mental health managment    Depressed Mood: Decrease thoughts that you'd be better off dead or of suicide / self-harm  Anxiety: will develop more effective coping skills to manage anxiety symptoms and will develop healthy cognitive patterns and beliefs    Current Stressors / Issues:  Patient had been scheduled to meet with a therapist through [facility], but cancelled due to concern about the provider being covered by insurance. Patient had completed PHQ9 on [facility], had a positive response to #9, and due to cancellation, was on [facility] daily BPA report.  This writer had contacted patient to assess safety, and offered patient a [facility] visit/episode of care as patient expressed interest in establishing care with a therapist, but with concerns about length of time it would take to be seen by a long-term therapist.      Patient shared that she has interest in therapy to help her manage symptoms of depression and anxiety. She reflected upon chronic and persistent symptoms that have been worsening over recent months. She stated that life feels more stable in comparison to years before, and feels now that it is time to identify ways to manage symptoms. She stated that she is currently , and has a step son. She discussed that she is trying to navigate anxiety and how to manage boundaries with her 's ex-wife. She stated that this is a significant source of stress for her that can result in physical symptoms of anxiety.  Patient discussed additional stress as she and her  look at co-parenting his son, with potential plans for them to have 100% physical custody of him. Patient is interested in individual therapy to help her process through these stressors, but shared that she has also considered couples therapy or family therapy.      Reviewed prior history of SI and recent thoughts. Recommended  and offer creation of safety plan, with patient declining. Patient has previously been sent by this provider a list of crisis resources after she had been on [facility] BPA report, and confirmed receipt of these resources.     Progress on Treatment Objective(s) / Homework:  New Objective established this session - CONTEMPLATION (Considering change and yet undecided); Intervened by assessing the negative and positive thinking (ambivalence) about behavior change    Motivational Interviewing    MI Intervention: Expressed Empathy/Understanding, Permission to raise concern or advise, Open-ended questions, Change talk (evoked) and Reframe     Change Talk Expressed by the Patient: Ability to change Reasons to change Need to change    Provider Response to Change Talk: E - Evoked more info from patient about behavior change, A - Affirmed patient's thoughts, decisions, or attempts at behavior change, R - Reflected patient's change talk and S - Summarized patient's change talk statements    Also provided psychoeducation about behavioral health condition, symptoms, and treatment options    Care Plan review completed: No    Medication Review:  No changes to current psychiatric medication(s)    Medication Compliance:  No, with patient expressing interest in therapy first and/or consulting with mental health provider prior to starting medication.    Changes in Health Issues:   None reported    Chemical Use Review:   Substance Use: Chemical use reviewed, no active concerns identified      Tobacco Use: No current tobacco use.      Assessment: Current Emotional / Mental Status (status of significant symptoms):  Risk status (Self / Other harm or suicidal ideation)  Patient has had a history of suicidal ideation: onset of ideation as a teen, history of passive and active thoughts and self-injurious behavior: history of cutting as a teen and denies a history of suicide attempts, homicidal ideation, homicidal behavior and and other safety  concerns  Patient denies current fears or concerns for personal safety.  Patient reports the following current or recent suicidal ideation or behaviors: reports chronic/persistant thoughts running in her background. She thinks about passive thoughts about it being easier if she is no longer around, and has considered various methods to end her life (swerving intro traffic, using a gun). Patient denied specifically planning out an idea, denied wanting to kill herself, and does not feel like she would ever be able to try because of her family and fear of pain..  Patient denies current or recent homicidal ideation or behaviors.  Patient denies current or recent self injurious behavior or ideation.  Patient denies other safety concerns.  A safety and risk management plan has been developed including: Recommended creation of safety plan, with patient declining. Patient has received crisis resources, and shared that she is able to talk to her  about her thoughts. Patient aware of need to monitor for ideations and create barrier between self and plan. Encouraged Empath unit or ED if concerns about worsening SI with plan, intent, and concerns about her safety.    Appearance:   Appropriate   Eye Contact:   Good   Psychomotor Behavior: Normal   Attitude:   Cooperative   Orientation:   All  Speech   Rate / Production: Normal    Volume:  Normal   Mood:    Euthymic  Affect:    Appropriate   Thought Content:  Clear   Thought Form:  Coherent  Logical   Insight:    Good     Diagnoses:  1. Depression, unspecified depression type    2. Anxiety disorder, unspecified type        Collateral Reports Completed:  Not Applicable    Plan: (Homework, other):  Patient was given information about behavioral services and encouraged to schedule a follow up appointment with the clinic [facility] in 2 weeks.  She was also given options for ongoing therapy.  CD Recommendations: No indications of CD issues.  SHITAL Warren,  [facility]      ______________________________________________________________________    [facility]  December 10, 2021

## 2021-12-14 ENCOUNTER — DOCUMENTATION ONLY (OUTPATIENT)
Dept: BEHAVIORAL HEALTH | Facility: CLINIC | Age: 26
End: 2021-12-14
Payer: COMMERCIAL

## 2022-01-03 ENCOUNTER — VIRTUAL VISIT (OUTPATIENT)
Dept: BEHAVIORAL HEALTH | Facility: CLINIC | Age: 27
End: 2022-01-03
Payer: COMMERCIAL

## 2022-01-03 DIAGNOSIS — F41.1 GENERALIZED ANXIETY DISORDER: Primary | ICD-10-CM

## 2022-01-03 DIAGNOSIS — F33.1 MODERATE EPISODE OF RECURRENT MAJOR DEPRESSIVE DISORDER (H): ICD-10-CM

## 2022-01-03 PROCEDURE — 90791 PSYCH DIAGNOSTIC EVALUATION: CPT | Mod: GT | Performed by: SOCIAL WORKER

## 2022-01-03 NOTE — PROGRESS NOTES
"[facility]  Provider Name:  Meredith Quintanilla    Credentials:  MSW, SHITAL    PATIENT'S NAME: Violet Rachel  PREFERRED NAME: Violet  PRONOUNS:  she, her, hers     MRN: [id number]  : 1995  ADDRESS: [address] [address]  ACCT. NUMBER:  [id number]  DATE OF SERVICE: 1/3/22  START TIME: 10:32 am  END TIME: 11:25 am   PREFERRED PHONE: [phone]  May we leave a program related message: Yes  SERVICE MODALITY:  Video Visit:      Provider verified identity through the following two step process.  Patient provided:  Patient  and Patient address    Telemedicine Visit: The patient's condition can be safely assessed and treated via synchronous audio and visual telemedicine encounter.      Reason for Telemedicine Visit: Services only offered telehealth    Originating Site (Patient Location): Patient's home    Distant Site (Provider Location): [facility]    Consent:  The patient/guardian has verbally consented to: the potential risks and benefits of telemedicine (video visit) versus in person care; bill my insurance or make self-payment for services provided; and responsibility for payment of non-covered services.     Patient would like the video invitation sent by:  My Chart    Mode of Communication:  Video Conference via [employer] (unable to connect via TalkTo, switched to Social IQ (Social Influence Quotient))    As the provider I attest to compliance with applicable laws and regulations related to telemedicine.    UNIVERSAL ADULT Mental Health DIAGNOSTIC ASSESSMENT    Identifying Information:  Patient is a 26 year old,  and  female.  The pronoun use throughout this assessment reflects the patient's chosen pronoun.  Patient was referred for an assessment by primary care clinic.  Patient attended the session alone.    Chief Complaint:   The reason for seeking services at this time is: \"depression, self-help\".  " "The problem(s) began 01/01/13, around time of puberty.    Patient has attempted to resolve these concerns in the past through: Patient reported history of treatment at age 17 and 18. She reported history of brief therapy and medication.  Patient stated that lifestyles helped to improve symptoms.     Patient stated that she is now  and has a stepson. She is noticing a return of symptoms that continue to worsen over time.  Patient reported that she continues to identify her goals for therapy, but would like to focus on how to best manage her mood and anxieties. She expressed concern about irritability and how to best navigate her relationship with her step-son and her physical symptoms when she anticipates interactions with her step son's mother.     Patient discussed worrying about numerous topics, ruminations, racing thoughts, and difficulties managing the ruminations. She described numerous physical symptoms, including muscle tension and tightness in her stomach. She expressed concern about low mood, low energy, poor sleep, and chronic SI.     Social/Family History:  Patient reported they grew up in [place], [place].  They were raised by biological parents  .  Parents were always together.  Patient reported that their childhood was: \"good\" Patient is the youngest of three children, but felt like an only child because her siblings are 6-8 years older. Patient shared that her father was a  and her mother stayed at home to raise her. Patient reported challenging dynamics given her mother's presence with the onset of her depression when she did not want to be near others. Patient stated that her parents' did not acknowledge/believe in depression when she was growing up. Patient described their current relationships with family of origin as \"good\", with relational changes in past 2 years. Patient stated her parents did not meet her  until they were engaged because they were not supportive of the " "relationship. She shared that her parents met her  , they liked him , and the relationship started to change. She stated that she now feels like she has a better relationship with father versus mother, but sees her family on a regular basis.     The patient describes their cultural background as ; .  Cultural influences and impact on patient's life structure, values, norms, and healthcare:  Patient stated that her father is Swazi. She stated that she has always felt that she was different aesthetically in comparison to other family members since she has red hair and white skin, and the rest of her family had darker hair and skin.  She shared that she never like part of the group, which was further complicated by her family not understanding and acknowledging mental health concerns while she was growing up. Contextual influences on patient's health include: Family Factors : blended family with stress navigating family dynamics and her role/voice with step son.  These factors will be addressed in the Preliminary Treatment plan. Patient identified their preferred language to be English. Patient reported they does not need the assistance of an  or other support involved in therapy.     Patient reported had no significant delays in developmental tasks.   Patient's highest education level was: cosmetology school.  Patient identified the following learning problems: none reported. Patient reported that she did not always care to complete homework and tasks outside of school. Modifications will notwill not be used to assist communication in therapy.  Patient reports they are  able to understand written materials.    Patient reported the following relationship history : history of casual relationships prior to .  Patient's current relationship status is  for 2 years, together off and on for 7 years. Patient identified their relationship as \"great\". She stated that their " relationship is impacted by external individuals (their step-son, her 's ex-wife). Patient stated that she is unsure what her role is and what she can be a part of as the step mother. She shared that she feels looked down on in parenting. Patient stated that her  maintains contact with his ex-wife as they co-parent, but she feels that she contacts him in inappropriate times and ways.   Patient identified their sexual orientation as bi-sexual.  Patient reported having  1 child(joselin), a step son. She stated that he does not respond to her and identifies their relationship as highly strained. Patient identified parents; spouse; co-worker as part of their support system.  Patient identified the quality of these relationships as good,  . Patient stated that it is not her natural tendency to reach out to friends, and shared that she does not feel like she has many close relationships.    Patient's current living/housing situation involves staying in own home/apartment.  The immediate members of family and household include Driss, 38,Spouse and Ozzy: tim,  and they report that housing is stable.    Patient is currently employed fulltime.  Patient stated that she has been employed as a  for 4 years. Patient stated that she enjoys her work. 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 Patient reports their finances are obtained through employment; spouse. Patient does not identify finances as a current stressor.      Patient reported that they have not been involved with the legal system. Patient does not report being under probation/ parole/ jurisdiction.     Patient's Strengths and Limitations:  Patient identified the following strengths or resources that will help them succeed in treatment: commitment to health and well being, family support, insight and intelligence. Things that may interfere with the patient's success in treatment include: none identified.      Personal and Family Medical History:  Patient does report a family history of mental health concerns.  Patient reports family history includes Asthma in her mother; Neurologic Disorder in her maternal grandmother; Prostate Cancer in her maternal grandfather; Thyroid Disease in her maternal grandmother.. Father may have experienced depression.     Patient does report Mental Health Diagnosis and/or Treatment.  Patient reported the following previous diagnoses which include(s): an anxiety disorder; depression; an eating disorder .  Patient reported mood changes during puberty. She stated that she experienced anorexia in her early 20s.  Patient has not received mental health services in the past:  therapy and mediation management. Psychiatric Hospitalizations: none . Patient denies a history of civil commitment.  Currently, patient is not receiving other mental health services.         Patient has had a physical exam to rule out medical causes for current symptoms.  Date of last physical exam was within the past year. Client was encouraged to follow up with PCP if symptoms were to develop. The patient has a [place] Primary Care Provider, who is named Sohail Newman.  Patient reports no current medical concerns.  Patient denies any issues with pain..   There are not significant appetite / nutritional concerns / weight changes.   Patient does not report a history of head injury / trauma / cognitive impairment.      Patient cannot supply information needed to verify current medications    Medication Adherence:  Patient reports not taking medications as prescribed.     Patient Allergies:  No Known Allergies    Medical History:    Past Medical History:   Diagnosis Date     Major depressive disorder, single episode, moderate (H) 1/13/2014     Mononucleosis 12/28/2012     Panic attack 1/13/2014         Current Mental Status Exam:   Appearance:  Appropriate    Eye Contact:  Good   Psychomotor:  Normal       Gait /  station:  no problem  Attitude / Demeanor: Cooperative  Interested Pleasant  Speech      Rate / Production: Normal/ Responsive      Volume:  Normal  volume      Language:  intact  Mood:   Euthymic  Affect:   Appropriate    Thought Content: Clear   Thought Process: Coherent  Goal Directed  Logical       Associations: No loosening of associations  Insight:   Good   Judgment:  Intact   Orientation:  All  Attention/concentration: Good    Rating Scales:    PHQ9:    PHQ-9 SCORE 2/18/2014 11/3/2021 11/28/2021   PHQ-9 Total Score 9 - -   PHQ-9 Total Score MyChart - 19 (Moderately severe depression) 14 (Moderate depression)   PHQ-9 Total Score - 19 14       GAD7:    ELKIN-7 SCORE 1/17/2014 2/18/2014 11/28/2021   Total Score 12 6 -   Total Score - - 6 (mild anxiety)   Total Score - - 6     CGI:     Clinical Global Impressions  First:  Considering your total clinical experience with this particular patient population, how severe are the patient's symptoms at this time?: 4 (1/3/2022  1:21 PM)    Most recent:  Compared to the patient's condition at the START of treatment, this patient's condition is: 4 (1/3/2022  1:21 PM)    Substance Use:  Patient did not report a family history of substance use concerns; see medical history section for details.  Patient has not received chemical dependency treatment in the past.  Patient has not ever been to detox.      Patient is not currently receiving any chemical dependency treatment.         Substance History of use Age of first use Date of last use     Pattern and duration of use (include amounts and frequency)   Alcohol currently use   17 12/09/21 A glass of wine per night. Historically 1-2 ciders per week. Typically drinks in a social situations.    Cannabis   never used     REPORTS SUBSTANCE USE: N/A     Amphetamines   never used     REPORTS SUBSTANCE USE: N/A   Cocaine/crack    never used       REPORTS SUBSTANCE USE: N/A   Hallucinogens never used         REPORTS SUBSTANCE USE: N/A  "  Inhalants never used         REPORTS SUBSTANCE USE: N/A   Heroin never used         REPORTS SUBSTANCE USE: N/A   Other Opiates never used     REPORTS SUBSTANCE USE: N/A   Benzodiazepine   never used     REPORTS SUBSTANCE USE: N/A   Barbiturates never used     REPORTS SUBSTANCE USE: N/A   Over the counter meds never used N/A 11/04/21 REPORTS SUBSTANCE USE: N/A   Caffeine used in the past 11   Drinks coffee throughout the day.    Nicotine  used in the past 18 02/01/18 REPORTS SUBSTANCE USE: N/A   Other substances not listed above:  Identify:  never used     REPORTS SUBSTANCE USE: N/A     Patient reported the following problems as a result of their substance use: no problems, not applicable.     CAGE- AID:    CAGE-AID Total Score 12/10/2021   Total Score 0   Total Score MyChart 0 (A total score of 2 or greater is considered clinically significant)     Substance Use: No symptoms    Based on the negative CAGE score and clinical interview there  are not indications of drug or alcohol abuse.    Significant Losses / Trauma / Abuse / Neglect Issues:   Patient did not serve in the .  There are indications or report of significant loss, trauma, abuse or neglect issues related to: history of  having an affair with his ex-wife while dating    Concerns for possible neglect are not present.     Safety Assessment:   Current Safety Concerns:  [place] Suicide Severity Rating Scale (Lifetime/Recent)  [place] Suicide Severity Rating (Lifetime/Recent) 12/10/2021   1. Wish to be Dead (Lifetime) Yes   Wish to be Dead Description (Lifetime) onset as a teen/puberty when experienced first symptoms of depression   1. Wish to be Dead (Recent) Yes   Wish to be Dead Description (Recent) feels like thoughts are a constant \"background noise\", a chatter in the back of her mind   2. Non-Specific Active Suicidal Thoughts (Lifetime) Yes   2. Non-Specific Active Suicidal Thoughts (Recent) Yes   Non-Specific Active Suicidal Thought " "Description (Recent) while driving the car, \"what if I swerved?\", etc   3. Active Suicidal Ideation with any Methods (Not Plan) Without Intent to Act (Lifetime) Yes   3. Active Suicidal Ideation with any Methods (Not Plan) Without Intent to Act (Recent) Yes   Active Suicidal Ideation with any Methods (Not Plan) Description (Recent) has considered various methods such as swerving in traffic, using a gun   4. Active Suicidal Ideation with Some Intent to Act, Without Specific Plan (Lifetime) No   4. Active Suicidal Ideation with Some Intent to Act, Without Specific Plan (Recent) No   5. Active Suicidal Ideation with Specific Plan and Intent (Lifetime) No   5. Active Suicidal Ideation with Specific Plan and Intent (Recent) No   Most Severe Ideation Rating (Lifetime) 4   Frequency (Lifetime) 4   Duration (Lifetime) 4   Controllability (Lifetime) 2   Protective Factors  (Lifetime) 1   Reasons for Ideation (Lifetime) 5   Most Severe Ideation Rating (Past Month) 4   Most Severe Ideation Description (Past Month) feels like constantly on mind, quiet background noise in her mind   Frequency (Past Month) 5   Duration (Past Month) 5   Controllability (Past Month) 2   Protective Factors (Past Month) 1   Reasons for Ideation (Past Month) 5   Actual Attempt (Lifetime) No   Actual Attempt (Past 3 Months) No   Has subject engaged in non-suicidal self-injurious behavior? (Lifetime) Yes   Comments as a teen   Has subject engaged in non-suicidal self-injurious behavior? (Past 3 Months) No   Interrupted Attempts (Lifetime) No   Interrupted Attempts (Past 3 Months) No   Aborted or Self-Interrupted Attempt (Lifetime) No   Aborted or Self-Interrupted Attempt (Past 3 Months) No   Preparatory Acts or Behavior (Lifetime) No   Preparatory Acts or Behavior (Past 3 Months) No   Most Recent Attempt Actual Lethality Code NA   Most Lethal Attempt Actual Lethality Code NA   Initial/First Attempt Actual Lethality Code NA     Patient denies current " homicidal ideation and behaviors.  Patient denies current self-injurious ideation and behaviors.    Patient denied risk behaviors associated with substance use.  Patient denies any high risk behaviors associated with mental health symptoms.  Patient reports the following current concerns for their personal safety: None.  Patient reports there are firearms in the house.     yes, they are secured.     History of Safety Concerns:  Patient denied a history of homicidal ideation.     Patient denied a history of personal safety concerns.    Patient denied a history of assaultive behaviors.    Patient denied a history of sexual assault behaviors.     Patient denied a history of risk behaviors associated with substance use.  Patient denies any history of high risk behaviors associated with mental health symptoms.  Patient reports the following protective factors: healthy fear of risky behaviors or pain    Risk Plan:  See Recommendations for Safety and Risk Management Plan    Review of Symptoms per patient report:  Depression: Change in sleep, Lack of interest, Change in energy level, Suicidal ideation, Low self-worth, Irritability, Feeling sad, down, or depressed and Anger outbursts (hard with step son) SI always in the mind/chatter- no active thought in the past week. I'm driving the car, what if I swerved. Always background noise.  Mild cutting in high school, no scars. Very distressing dream followed through  Nikki:  No Symptoms  Psychosis: No Symptoms  Anxiety: Excessive worry, Physical complaints, such as headaches, stomachaches, muscle tension, Sleep disturbance, Psychomotor agitation, Ruminations, Poor concentration, Irritability and Anger outbursts  Panic:  Palpitations and Shortness of breath  Post Traumatic Stress Disorder:  No Symptoms   Eating Disorder: No Symptoms  ADD / ADHD:  Poor task completion, Poor organizational skills and Distractibility  Conduct Disorder: No symptoms  Autism Spectrum Disorder: No  symptoms  Obsessive Compulsive Disorder: No Symptoms    Patient reports the following compulsive behaviors and treatment history: No symptoms.      Diagnostic Criteria:   Generalized Anxiety Disorder  A. Excessive anxiety and worry about a number of events or activities (such as work or school performance).   B. The person finds it difficult to control the worry.   - Restlessness or feeling keyed up or on edge.    - Difficulty concentrating or mind going blank.    - Irritability.    - Muscle tension.   D. The focus of the anxiety and worry is not confined to features of an Axis I disorder.  E. The anxiety, worry, or physical symptoms cause clinically significant distress or impairment in social, occupational, or other important areas of functioning.   F. The disturbance is not due to the direct physiological effects of a substance (e.g., a drug of abuse, a medication) or a general medical condition (e.g., hyperthyroidism) and does not occur exclusively during a Mood Disorder, a Psychotic Disorder, or a Pervasive Developmental Disorder. Major Depressive Disorder  A) Recurrent episode(s) - symptoms have been present during the same 2-week period and represent a change from previous functioning 5 or more symptoms (required for diagnosis)   - Depressed mood. Note: In children and adolescents, can be irritable mood.     - Decreased sleep.    - Psychomotor activity retardation.    - Fatigue or loss of energy.    - Feelings of worthlessness or inappropriate and excessive guilt.    - Recurrent thoughts of death (not just fear of dying), recurrent suicidal ideation without a specific plan, or a suicide attempt or a specific plan for committing suicide.   B) The symptoms cause clinically significant distress or impairment in social, occupational, or other important areas of functioning  C) The episode is not attributable to the physiological effects of a substance or to another medical condition  D) The occurence of major  depressive episode is not better explained by other thought / psychotic disorders  E) There has never been a manic episode or hypomanic episode    Functional Status:  Patient reports the following functional impairments: home life with family and management of the household and or completion of tasks.     WHODAS:   WHODAS 2.0 Total Score 12/4/2021 12/10/2021   Total Score 26 26   Total Score MyChart - 26     Nonprogrammatic care:  Patient is requesting basic services to address current mental health concerns.    Clinical Summary:  1. Reason for assessment: Patient expressing interest in individual therapy to help her set goals for improving her mental health. She is concerned with low mood, low energy, feeling sad, and chronic/persistent SI. Patient discussed anxiety related to numerous topics, racing thoughts, ruminations, and irritability.   2. Psychosocial, Cultural and Contextual Factors: currently , role navigation as step mother to step son, strained co-parenting relationship with step son's mother, work related stress   3. Principal DSM5 Diagnoses  (Sustained by DSM5 Criteria Listed Above):   296.32 (F33.1) Major Depressive Disorder, Recurrent Episode, Moderate _  300.02 (F41.1) Generalized Anxiety Disorder.  4. Other Diagnoses that is relevant to services: None identified   5. Provisional Diagnosis: None identified  6. Prognosis: Expect Improvement.  7. Likely consequences of symptoms if not treated: Without treatment, patient at risk for worsening mental health impacting her ability to care for self, family, work , including worsening SI leading to a need for a higher level of care.  8. Client strengths include:  creative, educated, empathetic, employed, goal-focused, intelligent, motivated, open to learning, open to suggestions / feedback, support of family, friends and providers, wants to learn and willing to ask questions .     Recommendations:     1. Plan for Safety and Risk  Management:   Recommended that patient call 911 or go to the local ED should there be a change in any of these risk factors.. Recommended completion of safety plan given risk history, with patient declining offer and confidence in ability to manage thoughts while utilizing natural support system.  Report to child / adult protection services was NA.     2. Patient's identified cultural concerns will be addressed by continuing to explore how culture and upbringing impact sense of self, core beliefs about self.     3. Initial Treatment will focus on:    Depressed Mood - irritability, low mood  Anxiety - racing thoughts, physical symptoms.     4. Resources/Service Plan:    services are not indicated.   Modifications to assist communication are not indicated.   Additional disability accommodations are not indicated.      5. Collaboration:   Collaboration / coordination of treatment will be initiated with the following  support professionals: primary care physician.      6.  Referrals:   The following referral(s) will be initiated: [facility]. Next Scheduled Appointment: [place] [facility] [place]. Patient to receive [facility] services to help bridge gap in care. Next scheduled appointment is with SHITAL Warren on 1/17/22. Discussed options for family therapy/couples therapy to help navigate blended family dynamics. Patient declined referral at this time, but will consider in the future as needed.     A Release of Information has been obtained for the following: no BONI needed at this time.    7. KENNY:    KENNY:  Discussed the general effects of drugs and alcohol on health and well-being. Provider gave patient printed information about the effects of chemical use on their health and well being. Recommendations:  No additional recommendations needed at this time .     8. Records:   These were reviewed at time of assessment.   Information in this assessment was  obtained from the medical record and provided by patient who is a good historian.    Patient will have open access to their mental health medical record.      Provider Name/ Credentials:  [facility]  December 10, 2022

## 2022-01-17 ENCOUNTER — VIRTUAL VISIT (OUTPATIENT)
Dept: BEHAVIORAL HEALTH | Facility: CLINIC | Age: 27
End: 2022-01-17
Payer: COMMERCIAL

## 2022-01-17 DIAGNOSIS — F41.1 GENERALIZED ANXIETY DISORDER: Primary | ICD-10-CM

## 2022-01-17 DIAGNOSIS — F33.1 MODERATE EPISODE OF RECURRENT MAJOR DEPRESSIVE DISORDER (H): ICD-10-CM

## 2022-01-17 PROCEDURE — 90834 PSYTX W PT 45 MINUTES: CPT | Mod: 95 | Performed by: SOCIAL WORKER

## 2022-01-17 NOTE — PROGRESS NOTES
[facility] - Integrated Behavioral Health Services  January 17, 2022    Behavioral Health Clinician Progress Note    Patient Name: Violet Rachel      Telemedicine Visit: The patient's condition can be safely assessed and treated via synchronous audio and visual telemedicine encounter.      Reason for Telemedicine Visit: Services only offered telehealth    Originating Site (Patient Location): Patient's home    Distant Site (Provider Location): [facility]: Maternal Fetal Medicine    Consent:  The patient/guardian has verbally consented to: the potential risks and benefits of telemedicine (video visit) versus in person care; bill my insurance or make self-payment for services provided; and responsibility for payment of non-covered services.     Mode of Communication:  Video Conference via [employer]    As the provider I attest to compliance with applicable laws and regulations related to telemedicine.         Service Type:  Individual     Session Start Time: 10: 30 am  Session End Time: 11: 22 am      Session Length: 38 - 52      Attendees: Patient    Visit Activities (Refresh list every visit): [facility] Only    Diagnostic Assessment Date: 1/3/22  Treatment Plan Review Date: 1/17/22  See Flowsheets for today's PHQ-9 and ELKIN-7 results  Previous PHQ-9:   PHQ-9 SCORE 2/18/2014 11/3/2021 11/28/2021   PHQ-9 Total Score 9 - -   PHQ-9 Total Score MyChart - 19 (Moderately severe depression) 14 (Moderate depression)   PHQ-9 Total Score - 19 14     Previous ELKIN-7:   ELKIN-7 SCORE 1/17/2014 2/18/2014 11/28/2021   Total Score 12 6 -   Total Score - - 6 (mild anxiety)   Total Score - - 6       SHANTELL LEVEL:  SHANTELL Score (Last Two) 12/19/2012 11/3/2021   SHANTELL Raw Score 29 22   Activation Score 36 37.2   SHANTELL Level 1 1       DATA  Extended Session (60+ minutes): No  Interactive Complexity: No  Crisis: No  BH Patient: No    Treatment Objective(s) Addressed in This Session:  Target Behavior(s): disease  "management/lifestyle changes , mental health managment    Depressed Mood: Decrease thoughts that you'd be better off dead or of suicide / self-harm  Anxiety: will develop more effective coping skills to manage anxiety symptoms and will develop healthy cognitive patterns and beliefs    Current Stressors / Issues:  Patient stated that overall anxiety has improved secondary to 's efforts to maintain and enforce boundaries with his son's mother. Patient shared that the primary anxiety and focus is related to her relationship with her stepson. Patient reflected upon feelings of dread that starts to set in hours before he comes into their home and the notable shift in her mental health for the duration of the time in her home. Patient stated that she finds herself feeling irritated and worrying about what interactions will happen next based on poor and challenging outcomes in the past. Patient discussed historical efforts to try to spend time with him in a positive way and the ongoing barriers she has faced when trying to have a positive relationship with him. Patient shared current practices to her approaches with him when trying to respond to challenging behaviors including setting realistic expectations for outcomes and staying grounded during the conversation. Patient stated that it is difficult to move forward after the conversation/disagreement, and agrees that it would be an opportunity to further explore since she has not identified tools and strategies to help her \"reset\" and discontinue the impact that the disagreement has on the rest of her day.     Reviewed referral information for [facility]. Patient discussed plans to call and schedule, without known barriers identifies. Patient stated that there are also plans for her stepson to participate in individual therapy, and while he is resistant to the idea, hopes that it will help to improve family functioning and dynamics.     Progress on Treatment Objective(s) " / Homework:  Satisfactory progress - PREPARATION (Decided to change - considering how); Intervened by negotiating a change plan and determining options / strategies for behavior change, identifying triggers, exploring social supports, and working towards setting a date to begin behavior change    Motivational Interviewing    MI Intervention: Expressed Empathy/Understanding, Permission to raise concern or advise, Open-ended questions, Change talk (evoked) and Reframe     Change Talk Expressed by the Patient: Ability to change Reasons to change Need to change    Provider Response to Change Talk: E - Evoked more info from patient about behavior change, A - Affirmed patient's thoughts, decisions, or attempts at behavior change, R - Reflected patient's change talk and S - Summarized patient's change talk statements    Also provided psychoeducation about behavioral health condition, symptoms, and treatment options    Care Plan review completed: No    Medication Review:  No changes to current psychiatric medication(s)    Medication Compliance:  No, with patient expressing interest in therapy first and/or consulting with mental health provider prior to starting medication.    Changes in Health Issues:   None reported    Chemical Use Review:   Substance Use: Chemical use reviewed, no active concerns identified      Tobacco Use: No current tobacco use.      Assessment: Current Emotional / Mental Status (status of significant symptoms):  Risk status (Self / Other harm or suicidal ideation)  Patient has had a history of suicidal ideation: onset of ideation as a teen, history of passive and active thoughts and self-injurious behavior: history of cutting as a teen and denies a history of suicide attempts, homicidal ideation, homicidal behavior and and other safety concerns  Patient denies current fears or concerns for personal safety.  Patient denies current or recent suicidal ideation or behaviors.  Patient denies current or  recent homicidal ideation or behaviors.  Patient denies current or recent self injurious behavior or ideation.  Patient denies other safety concerns.  A safety and risk management plan has been developed including: Recommended creation of safety plan, with patient declining. Patient has received crisis resources, and shared that she is able to talk to her  about her thoughts. Patient aware of need to monitor for ideations and create barrier between self and plan. Encouraged Empath unit or ED if concerns about worsening SI with plan, intent, and concerns about her safety.    Appearance:   Appropriate   Eye Contact:   Good   Psychomotor Behavior: Normal   Attitude:   Cooperative   Orientation:   All  Speech   Rate / Production: Normal    Volume:  Normal   Mood:    Euthymic  Affect:    Appropriate   Thought Content:  Clear   Thought Form:  Coherent  Logical   Insight:    Good     Diagnoses:  1. Generalized anxiety disorder    2. Moderate episode of recurrent major depressive disorder (H)        Collateral Reports Completed:  Not Applicable    Plan: (Homework, other):  Patient was given information about behavioral services and encouraged to schedule a follow up appointment with the clinic [facility] in 3 weeks.  She was also given options for ongoing therapy, with patient agreeing to call behavioral health access to schedule. Patient to write down and monitor cognitions and feelings that may be serving as a barrier to helping her move forward after disagreement with her stepson.  CD Recommendations: No indications of CD issues.  SHITAL Warren, [facility]      ______________________________________________________________________    SHITAL Warren  December 10, 2021    ______________________________________________________________________    Integrated Primary Care Behavioral Health Treatment Plan    Patient's Name: Violet Rachel  YOB: 1995    Date: 1/17/22    DSM-V Diagnoses: 296.32 (F33.1)  Major Depressive Disorder, Recurrent Episode, Moderate _ or 300.02 (F41.1) Generalized Anxiety Disorder  Psychosocial / Contextual Factors:  currently , role navigation as step mother to step son, strained co-parenting relationship with step son's mother, work related stress   WHODAS:   WHODAS 2.0 Total Score 12/4/2021 12/10/2021   Total Score 26 26   Total Score MyChart - 26       Referral / Collaboration:  The following referral(s) will be initiated: [facility] [place]. Referral placed 1/3.    Anticipated number of session or this episode of care: 4-6 to serve as bridge in care      MeasurableTreatment Goal(s) related to diagnosis / functional impairment(s)  Goal 1: Patient will reduce symptoms of anxiety as evidenced by decreased score on ELKIN 7 from 6 to 5.     I will know I've met my goal when I have more strategies to many racing thoughts and irritability.      Objective #A (Patient Action)    Patient will identify three distraction and diversion activities and use those activities to decrease level of anxiety  .  Status: New - Date: 1/17/22     Intervention(s)  [facility] will teach distress tolerance, mindfulness, and relaxation techniques.    Objective #B  Patient will use cognitive strategies identified in therapy to challenge anxious thoughts.  Status: New - Date: 1/17/22     Intervention(s)  [facility] will teach cognitive restructuring and defusion techniques.      Goal 2: Patient will reduce symptoms of depression as evidenced by decreased score on PHQ from 14 to 10.    I will know I've met my goal when mood improves and has more energy.      Objective #A (Patient Action)    Status: New - Date: 1/17/22     Patient will Decrease frequency and intensity of feeling down, depressed, hopeless.    Intervention(s)  [facility] will make referrals to outpatient therapist.    Objective #B  Patient will Identify negative self-talk and behaviors: challenge core beliefs, myths, and actions.    Status: New - Date: 1/17/22      Intervention(s)  [facility] will teach CBT techniques.      Patient has reviewed and agreed to the above plan.      Meredith Quintanilla [facility]  January 17, 2022

## 2022-02-07 ENCOUNTER — VIRTUAL VISIT (OUTPATIENT)
Dept: BEHAVIORAL HEALTH | Facility: CLINIC | Age: 27
End: 2022-02-07
Payer: COMMERCIAL

## 2022-02-07 DIAGNOSIS — F41.1 GENERALIZED ANXIETY DISORDER: Primary | ICD-10-CM

## 2022-02-07 DIAGNOSIS — F33.1 MODERATE EPISODE OF RECURRENT MAJOR DEPRESSIVE DISORDER (H): ICD-10-CM

## 2022-02-07 PROCEDURE — 90834 PSYTX W PT 45 MINUTES: CPT | Mod: GT | Performed by: SOCIAL WORKER

## 2022-02-07 NOTE — PROGRESS NOTES
[facility] - Integrated Behavioral Health Services  February 7, 2022    Behavioral Health Clinician Progress Note    Patient Name: Violet Rachel      Telemedicine Visit: The patient's condition can be safely assessed and treated via synchronous audio and visual telemedicine encounter.      Reason for Telemedicine Visit: Services only offered telehealth    Originating Site (Patient Location): Patient's home    Distant Site (Provider Location): [facility] Clinics: Maternal Fetal Medicine    Consent:  The patient/guardian has verbally consented to: the potential risks and benefits of telemedicine (video visit) versus in person care; bill my insurance or make self-payment for services provided; and responsibility for payment of non-covered services.     Mode of Communication:  Video Conference via [employer]    As the provider I attest to compliance with applicable laws and regulations related to telemedicine.         Service Type:  Individual     Session Start Time: 10: 31 am  Session End Time: 11: 23 am      Session Length: 38 - 52      Attendees: Patient    Visit Activities (Refresh list every visit): [facility] Only    Diagnostic Assessment Date: 1/3/22  Treatment Plan Review Date: 1/17/22  See Flowsheets for today's PHQ-9 and ELKIN-7 results  Previous PHQ-9:   PHQ-9 SCORE 2/18/2014 11/3/2021 11/28/2021   PHQ-9 Total Score 9 - -   PHQ-9 Total Score MyChart - 19 (Moderately severe depression) 14 (Moderate depression)   PHQ-9 Total Score - 19 14     Previous ELKIN-7:   ELKIN-7 SCORE 1/17/2014 2/18/2014 11/28/2021   Total Score 12 6 -   Total Score - - 6 (mild anxiety)   Total Score - - 6       SHANTELL LEVEL:  SHANTELL Score (Last Two) 12/19/2012 11/3/2021   SHANTELL Raw Score 29 22   Activation Score 36 37.2   SHANTELL Level 1 1       DATA  Extended Session (60+ minutes): No  Interactive Complexity: No  Crisis: No  BH Patient: No    Treatment Objective(s) Addressed in This Session:  Target Behavior(s): disease  management/lifestyle changes , mental health managment    Depressed Mood: Decrease thoughts that you'd be better off dead or of suicide / self-harm  Anxiety: will develop more effective coping skills to manage anxiety symptoms and will develop healthy cognitive patterns and beliefs    Current Stressors / Issues:  Patient reported that it was a difficult last week due to conflict with her . She shared that they have different approaches to managing money, communication, and management of the home. She processed through conflicts and the feelings that she needs to make changes since she cannot expect him to change. She shared that she feels okay focusing on herself, her role, and making changes.     Patient stated that her tim is now at her house for the next week. She stated that she noticed the last time he was in her home that she has a short fuse and starts all interactions with him in a highly irritated state. Patient discussed how she continues to feel frustrated by his maturity level and his inability to complete tasks on his own without prompting. Validated and normalized patient's experiences, and explored potential mindsets to help break cycle of unhelpful cognitions and beliefs when her tim is home. Explored potential challenges if expectations for her tim are not realistic for him and ongoing ways to continue to try to prepare for interactions with him.     Patient reported plan to schedule transfer to [facility] therapist later today. Reviewed phone number for behavioral intake.     Progress on Treatment Objective(s) / Homework:  No improvement - PREPARATION (Decided to change - considering how); Intervened by negotiating a change plan and determining options / strategies for behavior change, identifying triggers, exploring social supports, and working towards setting a date to begin behavior change    Motivational Interviewing    MI Intervention: Expressed Empathy/Understanding, Permission  to raise concern or advise, Open-ended questions, Change talk (evoked) and Reframe     Change Talk Expressed by the Patient: Ability to change Reasons to change Need to change    Provider Response to Change Talk: E - Evoked more info from patient about behavior change, A - Affirmed patient's thoughts, decisions, or attempts at behavior change, R - Reflected patient's change talk and S - Summarized patient's change talk statements       Cognitive Behavioral Therapy: Discussed connection between thoughts, feelings, and behaviors. Taught patient how to identify cognitive distortions, and assisted patient to identify own cognitive distortions. Taught and engaged patient in cognitive restructuring techniques.    Also provided psychoeducation about behavioral health condition, symptoms, and treatment options    Care Plan review completed: No    Medication Review:  No changes to current psychiatric medication(s)    Medication Compliance:  No, with patient expressing interest in therapy first and/or consulting with mental health provider prior to starting medication.    Changes in Health Issues:   None reported    Chemical Use Review:   Substance Use: Chemical use reviewed, no active concerns identified      Tobacco Use: No current tobacco use.      Assessment: Current Emotional / Mental Status (status of significant symptoms):  Risk status (Self / Other harm or suicidal ideation)  Patient has had a history of suicidal ideation: onset of ideation as a teen, history of passive and active thoughts and self-injurious behavior: history of cutting as a teen and denies a history of suicide attempts, homicidal ideation, homicidal behavior and and other safety concerns  Patient denies current fears or concerns for personal safety.  Patient denies current or recent suicidal ideation or behaviors.  Patient denies current or recent homicidal ideation or behaviors.  Patient denies current or recent self injurious behavior or  ideation.  Patient denies other safety concerns.  A safety and risk management plan has been developed including: Recommended creation of safety plan, with patient declining. Patient has received crisis resources, and shared that she is able to talk to her  about her thoughts. Patient aware of need to monitor for ideations and create barrier between self and plan. Encouraged Empath unit or ED if concerns about worsening SI with plan, intent, and concerns about her safety.    Appearance:   Appropriate   Eye Contact:   Good   Psychomotor Behavior: Normal   Attitude:   Cooperative   Orientation:   All  Speech   Rate / Production: Normal    Volume:  Normal   Mood:    Euthymic  Affect:    Appropriate   Thought Content:  Clear   Thought Form:  Coherent  Logical   Insight:    Good     Diagnoses:  1. Generalized anxiety disorder    2. Moderate episode of recurrent major depressive disorder (H)        Collateral Reports Completed:  Not Applicable    Plan: (Homework, other):  Patient was given information about behavioral services and encouraged to schedule a follow up appointment with the clinic [facility] in 1 month.  She was also given options for ongoing therapy, with patient agreeing to call behavioral health access to schedule. Continued to discuss CBT techniques for interactions with her stepson.  CD Recommendations: No indications of CD issues.  SHITAL Warren, [facility]      ______________________________________________________________________    SHITAL Warren  December 10, 2021    ______________________________________________________________________    Integrated Primary Care Behavioral Health Treatment Plan    Patient's Name: Violet Rachel  YOB: 1995    Date: 1/17/22    DSM-V Diagnoses: 296.32 (F33.1) Major Depressive Disorder, Recurrent Episode, Moderate _ or 300.02 (F41.1) Generalized Anxiety Disorder  Psychosocial / Contextual Factors:  currently , role navigation as step  mother to step son, strained co-parenting relationship with step son's mother, work related stress   WHODAS:   WHODAS 2.0 Total Score 12/4/2021 12/10/2021   Total Score 26 26   Total Score MyChart - 26       Referral / Collaboration:  The following referral(s) will be initiated: [facility] for long-term therapy. Referral placed 1/3.    Anticipated number of session or this episode of care: 4-6 to serve as bridge in care      MeasurableTreatment Goal(s) related to diagnosis / functional impairment(s)  Goal 1: Patient will reduce symptoms of anxiety as evidenced by decreased score on ELKIN 7 from 6 to 5.     I will know I've met my goal when I have more strategies to many racing thoughts and irritability.      Objective #A (Patient Action)    Patient will identify three distraction and diversion activities and use those activities to decrease level of anxiety  .  Status: New - Date: 1/17/22     Intervention(s)  [facility] will teach distress tolerance, mindfulness, and relaxation techniques.    Objective #B  Patient will use cognitive strategies identified in therapy to challenge anxious thoughts.  Status: New - Date: 1/17/22     Intervention(s)  [facility] will teach cognitive restructuring and defusion techniques.      Goal 2: Patient will reduce symptoms of depression as evidenced by decreased score on PHQ from 14 to 10.    I will know I've met my goal when mood improves and has more energy.      Objective #A (Patient Action)    Status: New - Date: 1/17/22     Patient will Decrease frequency and intensity of feeling down, depressed, hopeless.    Intervention(s)  [facility] will make referrals to outpatient therapist.    Objective #B  Patient will Identify negative self-talk and behaviors: challenge core beliefs, myths, and actions.    Status: New - Date: 1/17/22     Intervention(s)  [facility] will teach CBT techniques.      Patient has reviewed and agreed to the above plan.      SHITAL Warren  January 17, 2022

## 2022-02-20 ENCOUNTER — HEALTH MAINTENANCE LETTER (OUTPATIENT)
Age: 27
End: 2022-02-20

## 2022-04-22 ENCOUNTER — MYC MEDICAL ADVICE (OUTPATIENT)
Dept: FAMILY MEDICINE | Facility: CLINIC | Age: 27
End: 2022-04-22

## 2022-06-16 NOTE — TELEPHONE ENCOUNTER
Summary:    Patient is due/failing the following:   PAP    Reviewed:  [] CARE EVERYWHERE  [] LAST OV NOTE INCLUDING ENDO  [] FYI TAB  [] MYCHART ACTIVE?  [] LAST PANEL ENCOUNTER  [] FUTURE APPTS  [] IMMUNIZATIONS          Action needed:   Patient needs office visit for pap.    Type of outreach:    Phone, left message for patient to call back.    per OV note, she states she was having her IUD replaced and [facility], would have been 2020, please verify with patient when she had her last pap done                                                                            Debo Skinner/CRISTOPHER  [place]---[facility]

## 2022-10-23 ENCOUNTER — HEALTH MAINTENANCE LETTER (OUTPATIENT)
Age: 27
End: 2022-10-23

## 2023-02-13 ENCOUNTER — TELEPHONE (OUTPATIENT)
Dept: FAMILY MEDICINE | Facility: CLINIC | Age: 28
End: 2023-02-13
Payer: COMMERCIAL

## 2023-02-13 NOTE — TELEPHONE ENCOUNTER
Summary:    Patient is due/failing the following:   PHQ9    Reviewed:    [] CARE EVERYWHERE  [] LAST OV NOTE   [] FYI TAB  [] MYCHART ACTIVE?  [] LAST PANEL ENCOUNTER  [] FUTURE APPTS  [] IMMUNIZATIONS  [] Media Tab            Action needed:   Patient needs to do PHQ9.    Type of outreach:    Sent MyChart message.                                                                               Debo Skinner/CRISTOPHER  [place]---[facility]

## 2023-04-02 ENCOUNTER — HEALTH MAINTENANCE LETTER (OUTPATIENT)
Age: 28
End: 2023-04-02

## 2023-05-16 ENCOUNTER — TELEPHONE (OUTPATIENT)
Dept: FAMILY MEDICINE | Facility: CLINIC | Age: 28
End: 2023-05-16
Payer: COMMERCIAL

## 2023-05-16 NOTE — TELEPHONE ENCOUNTER
Summary:    Patient is due/failing the following:   PHQ9    Reviewed:    [] CARE EVERYWHERE  [] LAST OV NOTE   [] FYI TAB  [] MYCHART ACTIVE?  [] LAST PANEL ENCOUNTER  [] FUTURE APPTS  [] IMMUNIZATIONS  [] Media Tab            Action needed:   Patient needs to do PHQ9.    Type of outreach:    Sent MyChart message.                                                                               Debo Skinner/CRISTOPHER  [place]---[facility]

## 2023-09-15 ENCOUNTER — OFFICE VISIT (OUTPATIENT)
Dept: URGENT CARE | Facility: URGENT CARE | Age: 28
End: 2023-09-15
Payer: COMMERCIAL

## 2023-09-15 VITALS — RESPIRATION RATE: 16 BRPM | HEART RATE: 92 BPM | OXYGEN SATURATION: 98 % | TEMPERATURE: 97.6 F

## 2023-09-15 DIAGNOSIS — L73.9 FOLLICULITIS: Primary | ICD-10-CM

## 2023-09-15 PROCEDURE — 99213 OFFICE O/P EST LOW 20 MIN: CPT | Performed by: PHYSICIAN ASSISTANT

## 2023-09-15 RX ORDER — CEPHALEXIN 500 MG/1
500 CAPSULE ORAL 4 TIMES DAILY
Qty: 40 CAPSULE | Refills: 0 | Status: SHIPPED | OUTPATIENT
Start: 2023-09-15 | End: 2023-09-25

## 2023-09-15 NOTE — PROGRESS NOTES
Assessment & Plan:      Problem List Items Addressed This Visit    None  Visit Diagnoses       Folliculitis    -  Primary    Relevant Medications    cephALEXin (KEFLEX) 500 MG capsule          Medical Decision Making  Patient presents with painful erythematous bumps in the axillary regions bilaterally.  Symptoms appear most consistent with folliculitis.  Recommend oral antibiotics.  No obvious signs for contact dermatitis at this time, however recommend returning to her normal antiperspirants until symptoms improve.  Discussed treatment and symptomatic care.  Allergies and medication interactions reviewed.  Discussed signs of worsening symptoms and when to follow-up with PCP if no symptom improvement.     Subjective:      Violet Perales is a 27 year old female here for evaluation of painful erythematous bumps in the armpits bilaterally.  Onset of symptoms was 2 to 3 days ago with gradual worsening since then.  Patient first noted bumps in the left axilla region and now has similar painful bumps in the right axillary region.  No other fevers.  Patient has tried a different antiperspirants over the past and tries to use more natural products.  However, she has used the same antiperspirants on and off over the years without irritation.  Patient did try several days of topical hydrocortisone with no improvement.     The following portions of the patient's history were reviewed and updated as appropriate: allergies, current medications, and problem list.     Review of Systems  Pertinent items are noted in HPI.    Allergies  Allergies   Allergen Reactions    Adhesive Tape     Nickel        Family History   Problem Relation Age of Onset    Asthma Mother     Neurologic Disorder Maternal Grandmother         parkinson's    Thyroid Disease Maternal Grandmother     Prostate Cancer Maternal Grandfather        Social History     Tobacco Use    Smoking status: Never    Smokeless tobacco: Never   Substance Use Topics    Alcohol  use: Yes     Comment: occ        Objective:      Pulse 92   Temp 97.6  F (36.4  C) (Tympanic)   Resp 16   SpO2 98%   General appearance - alert, well appearing, and in no distress and non-toxic  Skin - several erythematous papules affecting the axillary regions bilaterally with swollen lymph nodes throughout, otherwise no obvious cysts or abscesses palpated    The use of Dragon/Organizer dictation services was used to construct the content of this note; any grammatical errors are non-intentional. Please contact the author directly if you are in need of any clarification.

## 2023-10-20 ENCOUNTER — TELEPHONE (OUTPATIENT)
Dept: FAMILY MEDICINE | Facility: CLINIC | Age: 28
End: 2023-10-20
Payer: COMMERCIAL

## 2023-10-20 NOTE — TELEPHONE ENCOUNTER
Summary:    Patient is due/failing the following:   PHQ9    Reviewed:    [] CARE EVERYWHERE  [] LAST OV NOTE   [] FYI TAB  [] MYCHART ACTIVE?  [] LAST PANEL ENCOUNTER  [] FUTURE APPTS  [] IMMUNIZATIONS  [] Media Tab            Action needed:   Patient needs to do PHQ9.    Type of outreach:    Sent MyChart message.                                                                               Debo Skinner/CRISTOPHER  [place]---[facility]

## 2023-11-03 ENCOUNTER — TELEPHONE (OUTPATIENT)
Dept: FAMILY MEDICINE | Facility: CLINIC | Age: 28
End: 2023-11-03
Payer: COMMERCIAL

## 2023-11-03 NOTE — TELEPHONE ENCOUNTER
Summary:    Patient is due/failing the following:   phq2    Reviewed:    [] CARE EVERYWHERE  [] LAST OV NOTE   [] FYI TAB  [] MYCHART ACTIVE?  [] LAST PANEL ENCOUNTER  [] FUTURE APPTS  [] IMMUNIZATIONS  [] Media Tab            Action needed:   Depression screening    Type of outreach:    Sent MyChart message.                                                                               Debo Skinner/CRISTOPHER  [place]---[facility]

## 2023-11-27 ASSESSMENT — ASTHMA QUESTIONNAIRES
ACT_TOTALSCORE: 25
QUESTION_3 LAST FOUR WEEKS HOW OFTEN DID YOUR ASTHMA SYMPTOMS (WHEEZING, COUGHING, SHORTNESS OF BREATH, CHEST TIGHTNESS OR PAIN) WAKE YOU UP AT NIGHT OR EARLIER THAN USUAL IN THE MORNING: NOT AT ALL
ACT_TOTALSCORE: 25
QUESTION_2 LAST FOUR WEEKS HOW OFTEN HAVE YOU HAD SHORTNESS OF BREATH: NOT AT ALL
QUESTION_5 LAST FOUR WEEKS HOW WOULD YOU RATE YOUR ASTHMA CONTROL: COMPLETELY CONTROLLED
QUESTION_1 LAST FOUR WEEKS HOW MUCH OF THE TIME DID YOUR ASTHMA KEEP YOU FROM GETTING AS MUCH DONE AT WORK, SCHOOL OR AT HOME: NONE OF THE TIME
QUESTION_4 LAST FOUR WEEKS HOW OFTEN HAVE YOU USED YOUR RESCUE INHALER OR NEBULIZER MEDICATION (SUCH AS ALBUTEROL): NOT AT ALL

## 2023-12-01 ENCOUNTER — VIRTUAL VISIT (OUTPATIENT)
Dept: INTERNAL MEDICINE | Facility: CLINIC | Age: 28
End: 2023-12-01
Payer: COMMERCIAL

## 2023-12-01 DIAGNOSIS — B37.2 YEAST INFECTION OF THE SKIN: Primary | ICD-10-CM

## 2023-12-01 DIAGNOSIS — Z30.9 ENCOUNTER FOR CONTRACEPTIVE MANAGEMENT, UNSPECIFIED TYPE: ICD-10-CM

## 2023-12-01 PROCEDURE — 99213 OFFICE O/P EST LOW 20 MIN: CPT | Mod: VID | Performed by: NURSE PRACTITIONER

## 2023-12-01 RX ORDER — FLUCONAZOLE 150 MG/1
TABLET ORAL
Qty: 5 TABLET | Refills: 0 | Status: SHIPPED | OUTPATIENT
Start: 2023-12-01

## 2023-12-01 ASSESSMENT — PATIENT HEALTH QUESTIONNAIRE - PHQ9: SUM OF ALL RESPONSES TO PHQ QUESTIONS 1-9: 0

## 2023-12-01 NOTE — PROGRESS NOTES
Violet is a 28 year old who is being evaluated via a billable video visit.      How would you like to obtain your AVS? Yessenia  If the video visit is dropped, the invitation should be resent by:yessenia   Will anyone else be joining your video visit? No          Assessment & Plan     Yeast infection of the skin  Discussed   - Ob/Gyn  Referral; Future  - fluconazole (DIFLUCAN) 150 MG tablet; Take 1 tab every 3 days for 3 dose and then weekly x 2    Encounter for contraceptive management, unspecified type  Will make ob appointment to discuss change from DEPO to IUD   Weight gain on DEPO       28 minutes spent by me on the date of the encounter doing chart review, history and exam, documentation and further activities per the note       Patient Instructions   Diflucan   Take 1 tab every 3 days for 3 dose and then weekly x 2    Ob gyn referral    ORIANA Plasencia CNP [facility]   Violet is a 28 year old, presenting for the following health issues:    History of Present Illness       Reason for visit:  Fungal infection  Symptom onset:  More than a month  Symptoms include:  Itchy rash with swollen lymphs and weight gain, bloating  Symptom intensity:  Moderate  Symptom progression:  Worsening  Had these symptoms before:  No  What makes it worse:  Some foods  What makes it better:  No    She eats 0-1 servings of fruits and vegetables daily.She consumes 1 sweetened beverage(s) daily.She exercises with enough effort to increase her heart rate 9 or less minutes per day.  She exercises with enough effort to increase her heart rate 3 or less days per week.   She is taking medications regularly.       Took keflex 9/2023 and folliculitis resolved   Lumps under armpit and that resolved     Now has red rash armpits- lotrimin and not getting better   Worse after antibiotics     Weight gain  [facility] DEPO [facility]   She had IUD in past and liked it but  felt more bloated and asked to remove it    Now feels it worked better for her   Referral to OB GYN            Review of Systems   Constitutional, HEENT, cardiovascular, pulmonary, GI, , musculoskeletal, neuro, skin, endocrine and psych systems are negative, except as otherwise noted.      Objective           Vitals:  No vitals were obtained today due to virtual visit.    Physical Exam   GENERAL: alert and no distress  EYES: Eyes grossly normal to inspection.  No discharge or erythema, or obvious scleral/conjunctival abnormalities.  RESP: No audible wheeze, cough, or visible cyanosis.  No visible retractions or increased work of breathing.    SKIN: under bilateral axilla has a couple lines in the creases of red itchy rash   NEURO: Cranial nerves grossly intact.  Mentation and speech appropriate for age.  PSYCH: Mentation appears normal, affect normal/bright, judgement and insight intact, normal speech and appearance well-groomed.                Video-Visit Details    Type of service:  Video Visit     Originating Location (pt. Location): Home    Distant Location (provider location):  On-site  Platform used for Video Visit: Jw

## 2023-12-01 NOTE — NURSING NOTE
"Chief Complaint   Patient presents with    Fungal Infection     initial LMP  (LMP Unknown)  Estimated body mass index is 24.93 kg/m  as calculated from the following:    Height as of 11/3/21: 1.556 m (5' 1.25\").    Weight as of 11/3/21: 60.3 kg (133 lb)..  bp completed using cuff size NA (Not Taken)  BRITTNY PORTILLO LPN  "

## 2024-06-02 ENCOUNTER — HEALTH MAINTENANCE LETTER (OUTPATIENT)
Age: 29
End: 2024-06-02

## 2025-06-15 ENCOUNTER — HEALTH MAINTENANCE LETTER (OUTPATIENT)
Age: 30
End: 2025-06-15